# Patient Record
Sex: FEMALE | Race: WHITE | NOT HISPANIC OR LATINO | Employment: STUDENT | URBAN - METROPOLITAN AREA
[De-identification: names, ages, dates, MRNs, and addresses within clinical notes are randomized per-mention and may not be internally consistent; named-entity substitution may affect disease eponyms.]

---
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
## 2018-01-15 ENCOUNTER — OFFICE VISIT (OUTPATIENT)
Dept: FAMILY MEDICINE | Facility: CLINIC | Age: 18
End: 2018-01-15
Payer: COMMERCIAL

## 2018-01-15 VITALS
WEIGHT: 126.6 LBS | OXYGEN SATURATION: 98 % | BODY MASS INDEX: 21.61 KG/M2 | HEIGHT: 64 IN | SYSTOLIC BLOOD PRESSURE: 106 MMHG | TEMPERATURE: 96.2 F | DIASTOLIC BLOOD PRESSURE: 64 MMHG | RESPIRATION RATE: 16 BRPM | HEART RATE: 83 BPM

## 2018-01-15 DIAGNOSIS — G43.801 OTHER MIGRAINE WITH STATUS MIGRAINOSUS, NOT INTRACTABLE: Primary | ICD-10-CM

## 2018-01-15 LAB
BASOPHILS # BLD AUTO: 0 10E9/L (ref 0–0.2)
BASOPHILS NFR BLD AUTO: 0.3 %
DIFFERENTIAL METHOD BLD: NORMAL
EOSINOPHIL # BLD AUTO: 0.1 10E9/L (ref 0–0.7)
EOSINOPHIL NFR BLD AUTO: 1.5 %
ERYTHROCYTE [DISTWIDTH] IN BLOOD BY AUTOMATED COUNT: 12.5 % (ref 10–15)
HCT VFR BLD AUTO: 43.6 % (ref 35–47)
HGB BLD-MCNC: 14.6 G/DL (ref 11.7–15.7)
LYMPHOCYTES # BLD AUTO: 2.5 10E9/L (ref 0.8–5.3)
LYMPHOCYTES NFR BLD AUTO: 42.3 %
MCH RBC QN AUTO: 29.4 PG (ref 26.5–33)
MCHC RBC AUTO-ENTMCNC: 33.5 G/DL (ref 31.5–36.5)
MCV RBC AUTO: 88 FL (ref 78–100)
MONOCYTES # BLD AUTO: 0.5 10E9/L (ref 0–1.3)
MONOCYTES NFR BLD AUTO: 8.9 %
NEUTROPHILS # BLD AUTO: 2.7 10E9/L (ref 1.6–8.3)
NEUTROPHILS NFR BLD AUTO: 47 %
PLATELET # BLD AUTO: 268 10E9/L (ref 150–450)
RBC # BLD AUTO: 4.96 10E12/L (ref 3.8–5.2)
WBC # BLD AUTO: 5.8 10E9/L (ref 4–11)

## 2018-01-15 PROCEDURE — 36415 COLL VENOUS BLD VENIPUNCTURE: CPT | Performed by: PHYSICIAN ASSISTANT

## 2018-01-15 PROCEDURE — 99214 OFFICE O/P EST MOD 30 MIN: CPT | Performed by: PHYSICIAN ASSISTANT

## 2018-01-15 PROCEDURE — 85025 COMPLETE CBC W/AUTO DIFF WBC: CPT | Performed by: PHYSICIAN ASSISTANT

## 2018-01-15 PROCEDURE — 80053 COMPREHEN METABOLIC PANEL: CPT | Performed by: PHYSICIAN ASSISTANT

## 2018-01-15 RX ORDER — SUMATRIPTAN 25 MG/1
25 TABLET, FILM COATED ORAL
Qty: 9 TABLET | Refills: 0 | Status: SHIPPED | OUTPATIENT
Start: 2018-01-15 | End: 2021-08-25

## 2018-01-15 NOTE — LETTER
January 17, 2018      Destini Higgins  [address] [address]        Dear ,    We are writing to inform you of your test results.      -Liver and gallbladder tests are normal. (ALT,AST, Alk phos, bilirubin), kidney function is normal (Cr, GFR), Sodium is normal, Potassium is normal, Calcium is normal, Glucose is normal   -Normal red blood cell (hgb) levels, normal white blood cell count and normal platelet levels.    Resulted Orders   Comprehensive metabolic panel (BMP + Alb, Alk Phos, ALT, AST, Total. Bili, TP)   Result Value Ref Range    Sodium 143 133 - 144 mmol/L    Potassium 4.3 3.4 - 5.3 mmol/L    Chloride 108 96 - 110 mmol/L    Carbon Dioxide 26 20 - 32 mmol/L    Anion Gap 9 3 - 14 mmol/L    Glucose 64 (L) 70 - 99 mg/dL      Comment:      Non Fasting    Urea Nitrogen 12 7 - 30 mg/dL    Creatinine 0.82 0.50 - 1.00 mg/dL    GFR Estimate 89 >60 mL/min/1.7m2      Comment:      Non  GFR Calc    GFR Estimate If Black >90 >60 mL/min/1.7m2      Comment:       GFR Calc    Calcium 9.3 8.5 - 10.1 mg/dL    Bilirubin Total 1.3 0.2 - 1.3 mg/dL    Albumin 4.4 3.4 - 5.0 g/dL    Protein Total 7.8 6.8 - 8.8 g/dL    Alkaline Phosphatase 66 40 - 150 U/L    ALT 29 0 - 50 U/L    AST 18 0 - 35 U/L   CBC with platelets differential   Result Value Ref Range    WBC 5.8 4.0 - 11.0 10e9/L    RBC Count 4.96 3.8 - 5.2 10e12/L    Hemoglobin 14.6 11.7 - 15.7 g/dL    Hematocrit 43.6 35.0 - 47.0 %    MCV 88 78 - 100 fl    MCH 29.4 26.5 - 33.0 pg    MCHC 33.5 31.5 - 36.5 g/dL    RDW 12.5 10.0 - 15.0 %    Platelet Count 268 150 - 450 10e9/L    Diff Method Automated Method     % Neutrophils 47.0 %    % Lymphocytes 42.3 %    % Monocytes 8.9 %    % Eosinophils 1.5 %    % Basophils 0.3 %    Absolute Neutrophil 2.7 1.6 - 8.3 10e9/L    Absolute Lymphocytes 2.5 0.8 - 5.3 10e9/L    Absolute Monocytes 0.5 0.0 - 1.3 10e9/L    Absolute Eosinophils 0.1 0.0 - 0.7 10e9/L    Absolute  Basophils 0.0 0.0 - 0.2 10e9/L       If you have any questions or concerns, please call the clinic at the number listed above.       Sincerely,          Aron Vaca PA-C

## 2018-01-15 NOTE — NURSING NOTE
"Chief Complaint   Patient presents with     Headache       Initial /64 (BP Location: Right arm, Patient Position: Chair, Cuff Size: Adult Regular)  Pulse 83  Temp 96.2  F (35.7  C) (Tympanic)  Resp 16  Ht 5' 3.75\" (1.619 m)  Wt 126 lb 9.6 oz (57.4 kg)  SpO2 98%  BMI 21.9 kg/m2 Estimated body mass index is 21.9 kg/(m^2) as calculated from the following:    Height as of this encounter: 5' 3.75\" (1.619 m).    Weight as of this encounter: 126 lb 9.6 oz (57.4 kg).  Medication Reconciliation: complete    Adela Sloan MA  "

## 2018-01-15 NOTE — PROGRESS NOTES
"  SUBJECTIVE:   Destini Higgins is a 17 year old female who presents to clinic today for the following health issues:    Headache  Onset: x 2 weeks    Description:   Location: right side  Character: throbbing pain  Frequency:  2-3 times over the past 2 weeks, infrequent prior to this  Duration:  3-4 hours     Intensity: moderate    Progression of Symptoms:  same    Accompanying Signs & Symptoms:  Stiff neck: no   Neck or upper back pain: no  Fever: a week ago  Sinus pressure: no   Nausea or vomiting: no   Dizziness: YES  Numbness: no   Weakness: no   Visual changes: YES- with migranes    History:   Head trauma: no   Family history of migraines: no   Previous tests for headaches: no   Neurologist evaluations: no   Able to do daily activities: YES  Wake with a headaches: no   Do headaches wake you up: no   Daily pain medication use: no   Work/school stressors/changes: no     Precipitating factors:   Does light make it worse: YES- migraine onset and screen use will hurt eyes  Does sound make it worse: no     Alleviating factors:  Does sleep help: YES    Therapies Tried and outcome: Ibuprofen (Advil, Motrin)      Over the past 6 months, Destini has been experiencing trouble with word finding as well as intermittent dizziness that she describes as a \" falling\" sensation.  These symptoms have been ongoing but not debilitating.  In the past 3-4 weeks, she has also had 2 headaches that were right sided and began with some blurring of vision and \" squiggles\" in the right eye. She had no nausea associated with the headache.  Headaches resolved with ibuprofen. She has hx of migraines when she was younger, but these symptoms have now been more frequent over the past 2 weeks, no known trigger        Problem list and histories reviewed & adjusted, as indicated.  Additional history: as documented    Patient Active Problem List   Diagnosis     Leg pain     Hamstring muscle strain     Acne vulgaris     Past Surgical History: " "  Procedure Laterality Date     REPAIR LIGAMENT HAND  2003       Social History   Substance Use Topics     Smoking status: Never Smoker     Smokeless tobacco: Never Used     Alcohol use No     Family History   Problem Relation Age of Onset     Family History Negative Mother      Family History Negative Father      Breast Cancer Maternal Grandmother 64     passed from liver failure     Liver Disease Maternal Grandmother      passed from     Leukemia Paternal Grandfather      Schizophrenia Paternal Uncle      DIABETES No family hx of      Coronary Artery Disease No family hx of          Current Outpatient Prescriptions   Medication Sig Dispense Refill     SUMAtriptan (IMITREX) 25 MG tablet Take 1 tablet (25 mg) by mouth at onset of headache for migraine May repeat in 2 hours. Max 8 tablets/24 hours. 9 tablet 0     SPIRONOLACTONE PO Take 12.5 mg by mouth daily       norgestimate-ethinyl estradiol (ORTHO-CYCLEN, SPRINTEC) 0.25-35 MG-MCG per tablet Take 1 tablet by mouth daily (Patient not taking: Reported on 1/15/2018) 84 tablet 3     No Known Allergies      Reviewed and updated as needed this visit by clinical staff     Reviewed and updated as needed this visit by Provider         ROS:  Constitutional, HEENT, cardiovascular, pulmonary, GI, , musculoskeletal, neuro, skin, endocrine and psych systems are negative, except as otherwise noted.      OBJECTIVE:   /64 (BP Location: Right arm, Patient Position: Chair, Cuff Size: Adult Regular)  Pulse 83  Temp 96.2  F (35.7  C) (Tympanic)  Resp 16  Ht 5' 3.75\" (1.619 m)  Wt 126 lb 9.6 oz (57.4 kg)  SpO2 98%  BMI 21.9 kg/m2  Body mass index is 21.9 kg/(m^2).  GENERAL: healthy, alert and no distress  RESP: lungs clear to auscultation - no rales, rhonchi or wheezes  CV: regular rate and rhythm, normal S1 S2, no S3 or S4, no murmur, click or rub  MS: no gross musculoskeletal defects noted, no edema  NEURO: Normal strength and tone, mentation intact and speech normal, " FROM of UE and LE, 5/5 strength noted, UE and LE DTRs intact, finger to nose, heel to shin and Stephen intact  PSYCH: mentation appears normal, affect normal/bright    Diagnostic Test Results:  Results for orders placed or performed in visit on 01/15/18 (from the past 24 hour(s))   CBC with platelets differential   Result Value Ref Range    WBC 5.8 4.0 - 11.0 10e9/L    RBC Count 4.96 3.8 - 5.2 10e12/L    Hemoglobin 14.6 11.7 - 15.7 g/dL    Hematocrit 43.6 35.0 - 47.0 %    MCV 88 78 - 100 fl    MCH 29.4 26.5 - 33.0 pg    MCHC 33.5 31.5 - 36.5 g/dL    RDW 12.5 10.0 - 15.0 %    Platelet Count 268 150 - 450 10e9/L    Diff Method Automated Method     % Neutrophils 47.0 %    % Lymphocytes 42.3 %    % Monocytes 8.9 %    % Eosinophils 1.5 %    % Basophils 0.3 %    Absolute Neutrophil 2.7 1.6 - 8.3 10e9/L    Absolute Lymphocytes 2.5 0.8 - 5.3 10e9/L    Absolute Monocytes 0.5 0.0 - 1.3 10e9/L    Absolute Eosinophils 0.1 0.0 - 0.7 10e9/L    Absolute Basophils 0.0 0.0 - 0.2 10e9/L       ASSESSMENT/PLAN:     1. Other migraine with status migrainosus, not intractable  Headache symptoms are consistent with migraine with visual aura. Unclear etiology of her dizziness and word finding difficulty.  Today we will get basic labs.  We discussed getting an MRI for further evaluation vs. Waiting, patient would like to move forward with MRI  - Comprehensive metabolic panel (BMP + Alb, Alk Phos, ALT, AST, Total. Bili, TP)  - CBC with platelets differential  - MR Brain w/o Contrast; Future  - SUMAtriptan (IMITREX) 25 MG tablet; Take 1 tablet (25 mg) by mouth at onset of headache for migraine May repeat in 2 hours. Max 8 tablets/24 hours.  Dispense: 9 tablet; Refill: 0    See Patient Instructions    Aron Vaca PA-C  [facility]

## 2018-01-15 NOTE — MR AVS SNAPSHOT
"              After Visit Summary   1/15/2018    Destini Higgins    MRN: [id number]           Patient Information     Date Of Birth          2000        Visit Information        Provider Department      1/15/2018 11:40 AM Aron Vaca PA-C [facility] [place]        Today's Diagnoses     Other migraine with status migrainosus, not intractable    -  1       Follow-ups after your visit        Follow-up notes from your care team     Return if symptoms worsen or fail to improve.      Future tests that were ordered for you today     Open Future Orders        Priority Expected Expires Ordered    MR Brain w/o Contrast Routine  1/15/2019 1/15/2018            Who to contact     If you have questions or need follow up information about today's clinic visit or your schedule please contact [facility] [place] directly at [phone].  Normal or non-critical lab and imaging results will be communicated to you by Qraniohart, letter or phone within 4 business days after the clinic has received the results. If you do not hear from us within 7 days, please contact the clinic through Qraniohart or phone. If you have a critical or abnormal lab result, we will notify you by phone as soon as possible.  Submit refill requests through crowdSPRING or call your pharmacy and they will forward the refill request to us. Please allow 3 business days for your refill to be completed.          Additional Information About Your Visit        MyChart Information     crowdSPRING lets you send messages to your doctor, view your test results, renew your prescriptions, schedule appointments and more. To sign up, go to [email or web address] . Click on \"Log in\" on the left side of the screen, which will take you to the Welcome page. Then click on \"Sign up Now\" on the right side of the page.     You will be asked to enter the access code listed below, as well as some personal information. Please follow the directions to create " "your username and password.     Your access code is: [email or web address]  Expires: 4/15/2018 12:20 PM     Your access code will  in 90 days. If you need help or a new code, please call your [place] clinic or [phone].        Care EveryWhere ID     This is your Care EveryWhere ID. This could be used by other organizations to access your [place] medical records  [id number]        Your Vitals Were     Pulse Temperature Respirations Height Pulse Oximetry BMI (Body Mass Index)    83 96.2  F (35.7  C) (Tympanic) 16 5' 3.75\" (1.619 m) 98% 21.9 kg/m2       Blood Pressure from Last 3 Encounters:   01/15/18 106/64   17 110/60   16 108/64    Weight from Last 3 Encounters:   01/15/18 126 lb 9.6 oz (57.4 kg) (47 %)*   17 130 lb (59 kg) (55 %)*   16 123 lb (55.8 kg) (47 %)*     * Growth percentiles are based on [facility] 2-20 Years data.              We Performed the Following     CBC with platelets differential     Comprehensive metabolic panel (BMP + Alb, Alk Phos, ALT, AST, Total. Bili, TP)          Today's Medication Changes          These changes are accurate as of: 1/15/18 12:20 PM.  If you have any questions, ask your nurse or doctor.               Start taking these medicines.        Dose/Directions    SUMAtriptan 25 MG tablet   Commonly known as:  IMITREX   Used for:  Other migraine with status migrainosus, not intractable   Started by:  Aron Vaca PA-C        Dose:  25 mg   Take 1 tablet (25 mg) by mouth at onset of headache for migraine May repeat in 2 hours. Max 8 tablets/24 hours.   Quantity:  9 tablet   Refills:  0            Where to get your medicines      These medications were sent to [facility] [address] [facility] - [place] - [address]  [address] [facility] [address]     Phone:  [phone]     SUMAtriptan 25 MG tablet                Primary Care Provider Office Phone # Fax #    Aron Vaca PA-C [phone]       [address] [place] [address]        Equal Access to Services     ERNIE AUGUSTIN : Hadii dina ku hadxino Sozohaibali, waaxda luqadaha, qaybta kaalmada ashanti, sha fleming. So [facility] [phone].    ATENCIÓN: Si habla español, tiene a west disposición servicios gratuitos de asistencia lingüística. [facility] [phone].    We comply with applicable federal civil rights laws and [place] laws. We do not discriminate on the basis of race, color, national origin, age, disability, sex, sexual orientation, or gender identity.            Thank you!     Thank you for choosing [facility] [place]  for your care. Our goal is always to provide you with excellent care. Hearing back from our patients is one way we can continue to improve our services. Please take a few minutes to complete the written survey that you may receive in the mail after your visit with us. Thank you!             Your Updated Medication List - Protect others around you: Learn how to safely use, store and throw away your medicines at www.disposemymeds.org.          This list is accurate as of: 1/15/18 12:20 PM.  Always use your most recent med list.                   Brand Name Dispense Instructions for use Diagnosis    norgestimate-ethinyl estradiol 0.25-35 MG-MCG per tablet    ORTHO-CYCLEN, SPRINTEC    84 tablet    Take 1 tablet by mouth daily    Birth control counseling       SPIRONOLACTONE PO      Take 12.5 mg by mouth daily        SUMAtriptan 25 MG tablet    IMITREX    9 tablet    Take 1 tablet (25 mg) by mouth at onset of headache for migraine May repeat in 2 hours. Max 8 tablets/24 hours.    Other migraine with status migrainosus, not intractable

## 2018-01-16 LAB
ALBUMIN SERPL-MCNC: 4.4 G/DL (ref 3.4–5)
ALP SERPL-CCNC: 66 U/L (ref 40–150)
ALT SERPL W P-5'-P-CCNC: 29 U/L (ref 0–50)
ANION GAP SERPL CALCULATED.3IONS-SCNC: 9 MMOL/L (ref 3–14)
AST SERPL W P-5'-P-CCNC: 18 U/L (ref 0–35)
BILIRUB SERPL-MCNC: 1.3 MG/DL (ref 0.2–1.3)
BUN SERPL-MCNC: 12 MG/DL (ref 7–30)
CALCIUM SERPL-MCNC: 9.3 MG/DL (ref 8.5–10.1)
CHLORIDE SERPL-SCNC: 108 MMOL/L (ref 96–110)
CO2 SERPL-SCNC: 26 MMOL/L (ref 20–32)
CREAT SERPL-MCNC: 0.82 MG/DL (ref 0.5–1)
GFR SERPL CREATININE-BSD FRML MDRD: 89 ML/MIN/1.7M2
GLUCOSE SERPL-MCNC: 64 MG/DL (ref 70–99)
POTASSIUM SERPL-SCNC: 4.3 MMOL/L (ref 3.4–5.3)
PROT SERPL-MCNC: 7.8 G/DL (ref 6.8–8.8)
SODIUM SERPL-SCNC: 143 MMOL/L (ref 133–144)

## 2018-01-20 ENCOUNTER — TRANSFERRED RECORDS (OUTPATIENT)
Dept: HEALTH INFORMATION MANAGEMENT | Facility: CLINIC | Age: 18
End: 2018-01-20

## 2018-01-22 ENCOUNTER — TELEPHONE (OUTPATIENT)
Dept: FAMILY MEDICINE | Facility: CLINIC | Age: 18
End: 2018-01-22

## 2018-01-22 NOTE — TELEPHONE ENCOUNTER
Results received from [facility] and given to provider to review.  MRI Brain w/out contrast  Kirstie CONTRERAS

## 2018-01-22 NOTE — TELEPHONE ENCOUNTER
Please call patient and inform her that her MRI is normal ( negative) etiology of her symptoms is likely migraines.  She may continue treatment as discussed in the office

## 2018-03-29 ENCOUNTER — MYC MEDICAL ADVICE (OUTPATIENT)
Dept: FAMILY MEDICINE | Facility: CLINIC | Age: 18
End: 2018-03-29

## 2018-03-30 NOTE — TELEPHONE ENCOUNTER
Please see Zilift message and advise.      Thank you,  Giovanna SUTHERLANDRN BSN  [facility]  [phone]

## 2018-03-30 NOTE — TELEPHONE ENCOUNTER
Patient informed via mychart reply with E-visit information and instruction    Jessica Cantrell RN

## 2018-04-01 ENCOUNTER — TRANSFERRED RECORDS (OUTPATIENT)
Dept: HEALTH INFORMATION MANAGEMENT | Facility: CLINIC | Age: 18
End: 2018-04-01

## 2018-04-01 LAB — CHLAMYDIA - HIM PATIENT REPORTED: NEGATIVE

## 2018-06-07 DIAGNOSIS — Z30.09 BIRTH CONTROL COUNSELING: ICD-10-CM

## 2018-06-07 RX ORDER — NORGESTIMATE AND ETHINYL ESTRADIOL 0.25-0.035
1 KIT ORAL DAILY
Qty: 84 TABLET | Refills: 1 | Status: SHIPPED | OUTPATIENT
Start: 2018-06-07 | End: 2018-11-27

## 2018-06-07 NOTE — TELEPHONE ENCOUNTER
"Requested Prescriptions   Pending Prescriptions Disp Refills     norgestimate-ethinyl estradiol (ORTHO-CYCLEN, SPRINTEC) 0.25-35 MG-MCG per tablet  Last Written Prescription Date:  8/17/17  Last Fill Quantity: 84,  # refills: 3   Last office visit: 1/15/2018 with prescribing provider:  Lio   Future Office Visit:     84 tablet 3     Sig: Take 1 tablet by mouth daily    Contraceptives Protocol Failed    6/7/2018 11:13 AM       Failed - Recent (12 mo) or future (30 days) visit within the authorizing provider's specialty    Patient had office visit in the last 12 months or has a visit in the next 30 days with authorizing provider or within the authorizing provider's specialty.  See \"Patient Info\" tab in inbasket, or \"Choose Columns\" in Meds & Orders section of the refill encounter.           Passed - Patient is not a current smoker if age is 35 or older       Passed - No active pregnancy on record       Passed - No positive pregnancy test in past 12 months          "

## 2018-06-07 NOTE — TELEPHONE ENCOUNTER
Pres[place] per [facility] [place].  Mitzy Huerta RN   [facility] - Triage

## 2018-10-19 DIAGNOSIS — Z30.09 BIRTH CONTROL COUNSELING: ICD-10-CM

## 2018-10-19 RX ORDER — NORGESTIMATE AND ETHINYL ESTRADIOL 0.25-0.035
KIT ORAL
Qty: 84 TABLET | Refills: 0 | OUTPATIENT
Start: 2018-10-19

## 2018-10-19 NOTE — TELEPHONE ENCOUNTER
Rx denied.  New rx was sent on 6/7/18 for 6 months and should have enough until 12/7.  Pt is also overdue for physical and med check.    Julianna SHETH RN  EP Triage

## 2018-10-19 NOTE — TELEPHONE ENCOUNTER
"Requested Prescriptions   Pending Prescriptions Disp Refills     FEMYNOR 0.25-35 MG-MCG per tablet [Pharmacy Med Name: FEMYNOR 28 TABLET]  Last Written Prescription Date:  6/7/18  Last Fill Quantity: 84,  # refills: 1   Last office visit: 1/15/2018 with prescribing provider:  Lio   Future Office Visit:     84 tablet 0     Sig: TAKE 1 TABLET BY MOUTH EVERY DAY    Contraceptives Protocol Passed    10/19/2018  2:04 AM       Passed - Patient is not a current smoker if age is 35 or older       Passed - Recent (12 mo) or future (30 days) visit within the authorizing provider's specialty    Patient had office visit in the last 12 months or has a visit in the next 30 days with authorizing provider or within the authorizing provider's specialty.  See \"Patient Info\" tab in inbasket, or \"Choose Columns\" in Meds & Orders section of the refill encounter.             Passed - No active pregnancy on record       Passed - No positive pregnancy test in past 12 months          "

## 2018-10-26 DIAGNOSIS — Z30.09 BIRTH CONTROL COUNSELING: ICD-10-CM

## 2018-10-26 RX ORDER — NORGESTIMATE AND ETHINYL ESTRADIOL 0.25-0.035
1 KIT ORAL DAILY
Qty: 84 TABLET | Refills: 1 | OUTPATIENT
Start: 2018-10-26

## 2018-10-26 NOTE — TELEPHONE ENCOUNTER
Patient has refills remaining with pharmacy.  Ashley Mckenna RN - Triage  [facility]

## 2018-10-26 NOTE — TELEPHONE ENCOUNTER
"Requested Prescriptions   Pending Prescriptions Disp Refills     norgestimate-ethinyl estradiol (ORTHO-CYCLEN, SPRINTEC) 0.25-35 MG-MCG per tablet  Last Written Prescription Date:  6-7-2018  Last Fill Quantity: 84 tablet,  # refills: 1   Last office visit: 1/15/2018 with prescribing provider:     Future Office Visit:     84 tablet 1     Sig: Take 1 tablet by mouth daily    Contraceptives Protocol Passed    10/26/2018 10:25 AM       Passed - Patient is not a current smoker if age is 35 or older       Passed - Recent (12 mo) or future (30 days) visit within the authorizing provider's specialty    Patient had office visit in the last 12 months or has a visit in the next 30 days with authorizing provider or within the authorizing provider's specialty.  See \"Patient Info\" tab in inbasket, or \"Choose Columns\" in Meds & Orders section of the refill encounter.             Passed - No active pregnancy on record       Passed - No positive pregnancy test in past 12 months          "

## 2018-11-16 ENCOUNTER — OFFICE VISIT (OUTPATIENT)
Dept: FAMILY MEDICINE | Facility: CLINIC | Age: 18
End: 2018-11-16
Payer: COMMERCIAL

## 2018-11-16 VITALS
BODY MASS INDEX: 22.53 KG/M2 | HEART RATE: 72 BPM | OXYGEN SATURATION: 100 % | HEIGHT: 64 IN | TEMPERATURE: 97.5 F | WEIGHT: 132 LBS | DIASTOLIC BLOOD PRESSURE: 72 MMHG | SYSTOLIC BLOOD PRESSURE: 101 MMHG

## 2018-11-16 DIAGNOSIS — Z00.00 ENCOUNTER FOR ROUTINE ADULT HEALTH EXAMINATION WITHOUT ABNORMAL FINDINGS: Primary | ICD-10-CM

## 2018-11-16 DIAGNOSIS — G43.809 OTHER MIGRAINE WITHOUT STATUS MIGRAINOSUS, NOT INTRACTABLE: ICD-10-CM

## 2018-11-16 DIAGNOSIS — Z23 NEED FOR PROPHYLACTIC VACCINATION AND INOCULATION AGAINST INFLUENZA: ICD-10-CM

## 2018-11-16 PROCEDURE — 90471 IMMUNIZATION ADMIN: CPT | Performed by: PHYSICIAN ASSISTANT

## 2018-11-16 PROCEDURE — 99213 OFFICE O/P EST LOW 20 MIN: CPT | Mod: 25 | Performed by: PHYSICIAN ASSISTANT

## 2018-11-16 PROCEDURE — 90686 IIV4 VACC NO PRSV 0.5 ML IM: CPT | Performed by: PHYSICIAN ASSISTANT

## 2018-11-16 PROCEDURE — 99395 PREV VISIT EST AGE 18-39: CPT | Mod: 25 | Performed by: PHYSICIAN ASSISTANT

## 2018-11-16 RX ORDER — PROPRANOLOL HYDROCHLORIDE 40 MG/1
40 TABLET ORAL 3 TIMES DAILY
Qty: 90 TABLET | Refills: 0 | Status: SHIPPED | OUTPATIENT
Start: 2018-11-16 | End: 2018-12-24

## 2018-11-16 NOTE — MR AVS SNAPSHOT
After Visit Summary   11/16/2018    Destini Higgins    MRN: [id number]           Patient Information     Date Of Birth          2000        Visit Information        Provider Department      11/16/2018 11:00 AM Aron Vaca PA-C [facility]        Today's Diagnoses     Encounter for routine adult health examination without abnormal findings    -  1    Other migraine without status migrainosus, not intractable          Care Instructions      Preventive Health Recommendations  Female Ages 18 to 20     Yearly exam:     See your health care provider every year in order to  o Review health changes.   o Discuss preventive care.    o Review your medicines if your doctor has prescribed any.      You should be tested each year for STDs (sexually transmitted diseases).       After age 20, talk to your provider about how often you should have cholesterol testing.      If you are at risk for diabetes, you should have a diabetes test (fasting glucose).     Shots:     Get a flu shot each year.     Get a tetanus shot every 10 years.     Consider getting the shot (vaccine) that prevents cervical cancer (Gardasil).    Nutrition:     Eat at least 5 servings of fruits and vegetables each day.    Eat whole-grain bread, whole-wheat pasta and brown rice instead of white grains and rice.    Get adequate Calcium and Vitamin D.     Lifestyle    Exercise at least 150 minutes a week each week (30 minutes a day, 5 days a week). This will help you control your weight and prevent disease.    No smoking.     Wear sunscreen to prevent skin cancer.    See your dentist every six months for an exam and cleaning.          Follow-ups after your visit        Follow-up notes from your care team     Return in about 1 month (around 12/16/2018) for Physical Exam,migraine follow up 1 month.      Who to contact     If you have questions or need follow up information about today's clinic visit or your schedule  "please contact [facility] [place] directly at [phone].  Normal or non-critical lab and imaging results will be communicated to you by MyChart, letter or phone within 4 business days after the clinic has received the results. If you do not hear from us within 7 days, please contact the clinic through Anhelohart or phone. If you have a critical or abnormal lab result, we will notify you by phone as soon as possible.  Submit refill requests through [employer] or call your pharmacy and they will forward the refill request to us. Please allow 3 business days for your refill to be completed.          Additional Information About Your Visit        AnheloharTurpitude Information     [employer] gives you secure access to your electronic health record. If you see a primary care provider, you can also send messages to your care team and make appointments. If you have questions, please call your primary care clinic.  If you do not have a primary care provider, please call [phone] and they will assist you.        Care EveryWhere ID     This is your Care EveryWhere ID. This could be used by other organizations to access your [place] medical records  [id number]        Your Vitals Were     Pulse Temperature Height Last Period Pulse Oximetry BMI (Body Mass Index)    72 97.5  F (36.4  C) (Tympanic) 5' 3.75\" (1.619 m) 11/13/2018 100% 22.84 kg/m2       Blood Pressure from Last 3 Encounters:   11/16/18 101/72   01/15/18 106/64   08/17/17 110/60    Weight from Last 3 Encounters:   11/16/18 132 lb (59.9 kg)   01/15/18 126 lb 9.6 oz (57.4 kg) (47 %)*   08/17/17 130 lb (59 kg) (55 %)*     * Growth percentiles are based on CDC 2-20 Years data.              Today, you had the following     No orders found for display         Today's Medication Changes          These changes are accurate as of 11/16/18 11:40 AM.  If you have any questions, ask your nurse or doctor.               Start taking these medicines.        Dose/Directions    " propranolol 40 MG tablet   Commonly known as:  INDERAL   Used for:  Other migraine without status migrainosus, not intractable   Started by:  Aron Vaca PA-C        Dose:  40 mg   Take 1 tablet (40 mg) by mouth 3 times daily   Quantity:  90 tablet   Refills:  0            Where to get your medicines      These medications were sent to [place] [address] [facility] - [place], [place] - [address]  [address] [facility], [place] [address]     Phone:  [phone]     propranolol 40 MG tablet                Primary Care Provider Office Phone # Fax #    Aron Vaca PA-C [phone]       [address]  [place] [address]        Equal Access to Services     ROSHAN AUGUSTIN : Hadii dina willson hadasho Soomaali, waaxda luqadaha, qaybta kaalmada adeegyada, waxbony berger . So [facility] [phone].    ATENCIÓN: Si habla español, tiene a west disposición servicios gratuitos de asistencia lingüística. [facility] [phone].    We comply with applicable federal civil rights laws and [place] laws. We do not discriminate on the basis of race, color, national origin, age, disability, sex, sexual orientation, or gender identity.            Thank you!     Thank you for choosing [facility] [place]  for your care. Our goal is always to provide you with excellent care. Hearing back from our patients is one way we can continue to improve our services. Please take a few minutes to complete the written survey that you may receive in the mail after your visit with us. Thank you!             Your Updated Medication List - Protect others around you: Learn how to safely use, store and throw away your medicines at www.disposemymeds.org.          This list is accurate as of 11/16/18 11:40 AM.  Always use your most recent med list.                   Brand Name Dispense Instructions for use Diagnosis    norgestimate-ethinyl estradiol 0.25-35 MG-MCG per tablet     ORTHO-CYCLEN, SPRINTEC    84 tablet    Take 1 tablet by mouth daily    Birth control counseling       propranolol 40 MG tablet    INDERAL    90 tablet    Take 1 tablet (40 mg) by mouth 3 times daily    Other migraine without status migrainosus, not intractable       SPIRONOLACTONE PO      Take 12.5 mg by mouth daily        SUMAtriptan 25 MG tablet    IMITREX    9 tablet    Take 1 tablet (25 mg) by mouth at onset of headache for migraine May repeat in 2 hours. Max 8 tablets/24 hours.    Other migraine with status migrainosus, not intractable

## 2018-11-16 NOTE — PROGRESS NOTES
SUBJECTIVE:   CC: Destini Higgins is an 18 year old woman who presents for preventive health visit.     Healthy Habits:    Do you get at least three servings of calcium containing foods daily (dairy, green leafy vegetables, etc.)? yes    Amount of exercise or daily activities, outside of work: 2-3 day(s) per week    Problems taking medications regularly No    Medication side effects: No    Have you had an eye exam in the past two years? yes    Do you see a dentist twice per year? yes    Do you have sleep apnea, excessive snoring or daytime drowsiness?no      Medication Followup of  Sumatriptan     Taking Medication as prescribed: yes    Side Effects:  None    Medication Helping Symptoms:  NO-discuss other med to manage migraine        Destini is experiencing more frequent migraines than previous.  These are occurring every 2 weeks, stress is a trigger. .  At our last visit she was given Imitrex.  She tells me that this caused her to have a lot of nausea and so she has not been taking it.  She has also stopped taking her OCP x 4 months.  Would like to discuss prophylactic migraine treatment today. See last visit, no new changed to headache severity or frequency    Studying abroad in [place] in January, needs school forms signed today    Today's PHQ-2 Score:   PHQ-2 ( 1999 Pfizer) 11/16/2018 8/17/2017   Q1: Little interest or pleasure in doing things 0 0   Q2: Feeling down, depressed or hopeless 0 0   PHQ-2 Score 0 0       Abuse: Current or Past(Physical, Sexual or Emotional)- No  Do you feel safe in your environment - Yes    Social History   Substance Use Topics     Smoking status: Never Smoker     Smokeless tobacco: Never Used     Alcohol use No     If you drink alcohol do you typically have >3 drinks per day or >7 drinks per week? No                     Reviewed orders with patient.  Reviewed health maintenance and updated orders accordingly - Yes  Patient Active Problem List   Diagnosis     Leg pain      Hamstring muscle strain     Acne vulgaris     Past Surgical History:   Procedure Laterality Date     REPAIR LIGAMENT HAND  2003       Social History   Substance Use Topics     Smoking status: Never Smoker     Smokeless tobacco: Never Used     Alcohol use No     Family History   Problem Relation Age of Onset     Family History Negative Mother      Family History Negative Father      Breast Cancer Maternal Grandmother 64     passed from liver failure     Liver Disease Maternal Grandmother      passed from     Leukemia Paternal Grandfather      Schizophrenia Paternal Uncle      Diabetes No family hx of      Coronary Artery Disease No family hx of          Current Outpatient Prescriptions   Medication Sig Dispense Refill     propranolol (INDERAL) 40 MG tablet Take 1 tablet (40 mg) by mouth 3 times daily 90 tablet 0     SPIRONOLACTONE PO Take 12.5 mg by mouth daily       norgestimate-ethinyl estradiol (ORTHO-CYCLEN, SPRINTEC) 0.25-35 MG-MCG per tablet Take 1 tablet by mouth daily (Patient not taking: Reported on 11/16/2018) 84 tablet 1     SUMAtriptan (IMITREX) 25 MG tablet Take 1 tablet (25 mg) by mouth at onset of headache for migraine May repeat in 2 hours. Max 8 tablets/24 hours. (Patient not taking: Reported on 11/16/2018) 9 tablet 0     No Known Allergies  Recent Labs   Lab Test  01/15/18   1221   ALT  29   CR  0.82   GFRESTIMATED  89   GFRESTBLACK  >90   POTASSIUM  4.3        Mammogram not appropriate for this patient based on age.    Pertinent mammograms are reviewed under the imaging tab.  History of abnormal Pap smear: NO - under age 21, PAP not appropriate for age     Reviewed and updated as needed this visit by clinical staff  Tobacco  Allergies  Meds         Reviewed and updated as needed this visit by Provider        Past Medical History:   Diagnosis Date     Cafe au lait spots     right ankle, left inner thigh     Constipation      Cough     albuterol     NO ACTIVE PROBLEMS      TMJ syndrome       "Torticollis     as infant      Past Surgical History:   Procedure Laterality Date     REPAIR LIGAMENT HAND  2003     Obstetric History     No data available          ROS:  CONSTITUTIONAL: NEGATIVE for fever, chills, change in weight  INTEGUMENTARU/SKIN: NEGATIVE for worrisome rashes, moles or lesions  EYES: NEGATIVE for vision changes or irritation  ENT: NEGATIVE for ear, mouth and throat problems  RESP: NEGATIVE for significant cough or SOB  BREAST: NEGATIVE for masses, tenderness or discharge  CV: NEGATIVE for chest pain, palpitations or peripheral edema  GI: NEGATIVE for nausea, abdominal pain, heartburn, or change in bowel habits  : NEGATIVE for unusual urinary or vaginal symptoms. Periods are regular.  MUSCULOSKELETAL: NEGATIVE for significant arthralgias or myalgia  NEURO: NEGATIVE for weakness, dizziness or paresthesias  PSYCHIATRIC: NEGATIVE for changes in mood or affect    OBJECTIVE:   /72  Pulse 72  Temp 97.5  F (36.4  C) (Tympanic)  Ht 5' 3.75\" (1.619 m)  Wt 132 lb (59.9 kg)  LMP 11/13/2018  SpO2 100%  BMI 22.84 kg/m2  EXAM:  GENERAL: healthy, alert and no distress  EYES: Eyes grossly normal to inspection, PERRL and conjunctivae and sclerae normal  HENT: ear canals and TM's normal, nose and mouth without ulcers or lesions  NECK: no adenopathy, no asymmetry, masses, or scars and thyroid normal to palpation  RESP: lungs clear to auscultation - no rales, rhonchi or wheezes  CV: regular rate and rhythm, normal S1 S2, no S3 or S4, no murmur, click or rub, no peripheral edema   ABDOMEN: soft, nontender, no hepatosplenomegaly, no masses and bowel sounds normal  MS: no gross musculoskeletal defects noted, no edema  SKIN: no suspicious lesions or rashes  NEURO: Normal strength and tone, mentation intact and speech normal  PSYCH: mentation appears normal, affect normal/bright    Diagnostic Test Results:  none     ASSESSMENT/PLAN:   1. Encounter for routine adult health examination without abnormal " "findings    2. Other migraine without status migrainosus, not intractable  Will try propranolol x 1 month trial.  She will let me know how this is working for her prior to leaving the country and will refill as needed  - propranolol (INDERAL) 40 MG tablet; Take 1 tablet (40 mg) by mouth 3 times daily  Dispense: 90 tablet; Refill: 0    3. Need for prophylactic vaccination and inoculation against influenza  - FLU VACCINE, SPLIT VIRUS, IM (QUADRIVALENT) [35700]- >3 YRS  - Vaccine Administration, Initial [24840]      COUNSELING:   Reviewed preventive health counseling, as reflected in patient instructions       Regular exercise       Healthy diet/nutrition    BP Readings from Last 1 Encounters:   11/16/18 101/72     Estimated body mass index is 22.84 kg/(m^2) as calculated from the following:    Height as of this encounter: 5' 3.75\" (1.619 m).    Weight as of this encounter: 132 lb (59.9 kg).           reports that she has never smoked. She has never used smokeless tobacco.      Counseling Resources:  ATP IV Guidelines  Pooled Cohorts Equation Calculator  Breast Cancer Risk Calculator  FRAX Risk Assessment  ICSI Preventive Guidelines  Dietary Guidelines for Americans, 2010  [employer]'s MyPlate  ASA Prophylaxis  Lung CA Screening    Aron Vaca PA-C  [facility] [place]    Injectable Influenza Immunization Documentation    1.  Is the person to be vaccinated sick today?   No    2. Does the person to be vaccinated have an allergy to a component   of the vaccine?   No  Egg Allergy Algorithm Link    3. Has the person to be vaccinated ever had a serious reaction   to influenza vaccine in the past?   No    4. Has the person to be vaccinated ever had Guillain-Barré syndrome?   No    Form completed by          "

## 2018-11-27 DIAGNOSIS — Z30.09 BIRTH CONTROL COUNSELING: ICD-10-CM

## 2018-11-27 RX ORDER — NORGESTIMATE AND ETHINYL ESTRADIOL 0.25-0.035
KIT ORAL
Qty: 84 TABLET | Refills: 3 | Status: SHIPPED | OUTPATIENT
Start: 2018-11-27 | End: 2021-08-25

## 2018-11-27 NOTE — TELEPHONE ENCOUNTER
"Requested Prescriptions   Pending Prescriptions Disp Refills     FEMYNOR 0.25-35 MG-MCG per tablet [Pharmacy Med Name: FEMYNOR 28 TABLET]  Last Written Prescription Date:  6/7/18  Last Fill Quantity: 84,  # refills: 1   Last office visit: 11/16/2018 with prescribing provider:  Lio   Future Office Visit:     84 tablet 0     Sig: TAKE 1 TABLET BY MOUTH EVERY DAY    Contraceptives Protocol Passed    11/27/2018  2:49 PM       Passed - Patient is not a current smoker if age is 35 or older       Passed - Recent (12 mo) or future (30 days) visit within the authorizing provider's specialty    Patient had office visit in the last 12 months or has a visit in the next 30 days with authorizing provider or within the authorizing provider's specialty.  See \"Patient Info\" tab in inbasket, or \"Choose Columns\" in Meds & Orders section of the refill encounter.             Passed - No active pregnancy on record       Passed - No positive pregnancy test in past 12 months          "

## 2018-11-28 ENCOUNTER — TELEPHONE (OUTPATIENT)
Dept: FAMILY MEDICINE | Facility: CLINIC | Age: 18
End: 2018-11-28

## 2018-11-28 NOTE — TELEPHONE ENCOUNTER
Patient states that she had stopped Femynor the beginning of October. The last migraine she had was the beginning of November.  Reports that the last 4 weeks she has not had a migraine.  Patient thinks that it is due to the Femynor.  Asking for a prescription for a different oral contraceptive.  LOV 11/16/18 for CPE.  Brittnee Concepcion RN

## 2018-11-28 NOTE — TELEPHONE ENCOUNTER
Prescription approved per [facility] Refill Protocol.    Julianna SHETH RN  EP Triage

## 2018-11-29 NOTE — TELEPHONE ENCOUNTER
Does Destini typically have an aura prior to her migraines?  Would she be interested in a another birth control option such as Nexplanon or IUD?

## 2018-11-29 NOTE — TELEPHONE ENCOUNTER
S/w pt and yes she does get auras before migraines.  Pt states the iud scares her but is interested in the nexplanon but does not know anything about it.  States her mom is not sure of the nexplanon either.  Pt is going abroad starting January.    Scheduled for Monday 12/3 at 3pm for nexplanon and iud discussion.    Routing to Aron SHETH RN  EP Triage

## 2018-12-03 ENCOUNTER — OFFICE VISIT (OUTPATIENT)
Dept: FAMILY MEDICINE | Facility: CLINIC | Age: 18
End: 2018-12-03
Payer: COMMERCIAL

## 2018-12-03 VITALS
HEART RATE: 88 BPM | WEIGHT: 129 LBS | SYSTOLIC BLOOD PRESSURE: 115 MMHG | BODY MASS INDEX: 22.02 KG/M2 | TEMPERATURE: 98.2 F | DIASTOLIC BLOOD PRESSURE: 67 MMHG | HEIGHT: 64 IN | OXYGEN SATURATION: 99 %

## 2018-12-03 DIAGNOSIS — G43.809 OTHER MIGRAINE WITHOUT STATUS MIGRAINOSUS, NOT INTRACTABLE: ICD-10-CM

## 2018-12-03 DIAGNOSIS — Z30.017 ENCOUNTER FOR INITIAL PRESCRIPTION OF IMPLANTABLE SUBDERMAL CONTRACEPTIVE: Primary | ICD-10-CM

## 2018-12-03 LAB — BETA HCG QUAL IFA URINE: NEGATIVE

## 2018-12-03 PROCEDURE — 84703 CHORIONIC GONADOTROPIN ASSAY: CPT | Performed by: PHYSICIAN ASSISTANT

## 2018-12-03 PROCEDURE — 99213 OFFICE O/P EST LOW 20 MIN: CPT | Performed by: PHYSICIAN ASSISTANT

## 2018-12-03 NOTE — MR AVS SNAPSHOT
After Visit Summary   12/3/2018    Destini Higgins    MRN: [id number]           Patient Information     Date Of Birth          2000        Visit Information        Provider Department      12/3/2018 3:00 PM Aron Vaca PA-C [facility]        Today's Diagnoses     Encounter for initial prescription of implantable subdermal contraceptive    -  1    Other migraine without status migrainosus, not intractable           Follow-ups after your visit        Follow-up notes from your care team     Return for if new or persistent symptoms.      Who to contact     If you have questions or need follow up information about today's clinic visit or your schedule please contact [facility] directly at [phone].  Normal or non-critical lab and imaging results will be communicated to you by Newvemhart, letter or phone within 4 business days after the clinic has received the results. If you do not hear from us within 7 days, please contact the clinic through Newvemhart or phone. If you have a critical or abnormal lab result, we will notify you by phone as soon as possible.  Submit refill requests through VisConPro or call your pharmacy and they will forward the refill request to us. Please allow 3 business days for your refill to be completed.          Additional Information About Your Visit        MyChart Information     VisConPro gives you secure access to your electronic health record. If you see a primary care provider, you can also send messages to your care team and make appointments. If you have questions, please call your primary care clinic.  If you do not have a primary care provider, please call [phone] and they will assist you.        Care EveryWhere ID     This is your Care EveryWhere ID. This could be used by other organizations to access your [facility] medical records  [id number]        Your Vitals Were     Pulse Temperature Height Last Period Pulse  "Oximetry BMI (Body Mass Index)    88 98.2  F (36.8  C) (Tympanic) 5' 3.75\" (1.619 m) 11/13/2018 99% 22.32 kg/m2       Blood Pressure from Last 3 Encounters:   12/03/18 115/67   11/16/18 101/72   01/15/18 106/64    Weight from Last 3 Encounters:   12/03/18 129 lb (58.5 kg)   11/16/18 132 lb (59.9 kg)   01/15/18 126 lb 9.6 oz (57.4 kg) (47 %)*     * Growth percentiles are based on [facility] 2-20 Years data.              We Performed the Following     Beta HCG Qual, Urine - [facility] ([id number])        Primary Care Provider Office Phone # Fax #    Aron Vaca PA-C [phone]        [facility] [address]  [place] [address]        Equal Access to Services     [facility]: Hadii aad ku hadasho Soomaali, waaxda luqadaha, qaybta kaalmada adeegyada, waxay miguelin haymigueln alirio berger . So [facility] [phone].    ATENCIÓN: Si habla español, tiene a west disposición servicios gratuitos de asistencia lingüística. Llame al [phone].    We comply with applicable federal civil rights laws and [place] laws. We do not discriminate on the basis of race, color, national origin, age, disability, sex, sexual orientation, or gender identity.            Thank you!     Thank you for choosing [facility] [place]  for your care. Our goal is always to provide you with excellent care. Hearing back from our patients is one way we can continue to improve our services. Please take a few minutes to complete the written survey that you may receive in the mail after your visit with us. Thank you!             Your Updated Medication List - Protect others around you: Learn how to safely use, store and throw away your medicines at www.disposemymeds.org.          This list is accurate as of 12/3/18  4:18 PM.  Always use your most recent med list.                   Brand Name Dispense Instructions for use Diagnosis    FEMYNOR 0.25-35 MG-MCG tablet   Generic drug:  norgestimate-ethinyl estradiol     84 " tablet    TAKE 1 TABLET BY MOUTH EVERY DAY    Birth control counseling       propranolol 40 MG tablet    INDERAL    90 tablet    Take 1 tablet (40 mg) by mouth 3 times daily    Other migraine without status migrainosus, not intractable       SPIRONOLACTONE PO      Take 12.5 mg by mouth daily        SUMAtriptan 25 MG tablet    IMITREX    9 tablet    Take 1 tablet (25 mg) by mouth at onset of headache for migraine May repeat in 2 hours. Max 8 tablets/24 hours.    Other migraine with status migrainosus, not intractable

## 2018-12-03 NOTE — PROGRESS NOTES
SUBJECTIVE:   Destini Higgins is a 18 year old female who presents to clinic today for the following health issues:      Concern - Contraception Consult       Description:   Discuss/consult Nexplanon implant   Previous OCP use   Lmp:  11/24/2018      Griselda has hx of migraine with aura, tells me that she has been off  for 4-5 months and her migraines are much better controlled without OCP.  She would like to discuss different birth control options today.  She will be studying abroad in [place] at the end of the month.  She is not interested in a daily options.          Problem list and histories reviewed & adjusted, as indicated.  Additional history: as documented    Patient Active Problem List   Diagnosis     Leg pain     Hamstring muscle strain     Acne vulgaris     Other migraine without status migrainosus, not intractable     Past Surgical History:   Procedure Laterality Date     REPAIR LIGAMENT HAND  2003       Social History   Substance Use Topics     Smoking status: Never Smoker     Smokeless tobacco: Never Used     Alcohol use No     Family History   Problem Relation Age of Onset     Family History Negative Mother      Family History Negative Father      Breast Cancer Maternal Grandmother 64     passed from liver failure     Liver Disease Maternal Grandmother      passed from     Leukemia Paternal Grandfather      Schizophrenia Paternal Uncle      Diabetes No family hx of      Coronary Artery Disease No family hx of          Current Outpatient Prescriptions   Medication Sig Dispense Refill     FEMYNOR 0.25-35 MG-MCG per tablet TAKE 1 TABLET BY MOUTH EVERY DAY 84 tablet 3     propranolol (INDERAL) 40 MG tablet Take 1 tablet (40 mg) by mouth 3 times daily 90 tablet 0     SPIRONOLACTONE PO Take 12.5 mg by mouth daily       SUMAtriptan (IMITREX) 25 MG tablet Take 1 tablet (25 mg) by mouth at onset of headache for migraine May repeat in 2 hours. Max 8 tablets/24 hours. (Patient not taking: Reported on  "12/3/2018) 9 tablet 0     No Known Allergies    Reviewed and updated as needed this visit by clinical staff       Reviewed and updated as needed this visit by Provider         ROS:  Constitutional, HEENT, cardiovascular, pulmonary, gi and gu systems are negative, except as otherwise noted.    OBJECTIVE:     /67  Pulse 88  Temp 98.2  F (36.8  C) (Tympanic)  Ht 5' 3.75\" (1.619 m)  Wt 129 lb (58.5 kg)  LMP 11/13/2018  SpO2 99%  BMI 22.32 kg/m2  Body mass index is 22.32 kg/(m^2).  GENERAL: healthy, alert and no distress  RESP: lungs clear to auscultation - no rales, rhonchi or wheezes  CV: regular rate and rhythm, normal S1 S2, no S3 or S4, no murmur, click or rub, no peripheral edema and peripheral pulses strong  PSYCH: mentation appears normal, affect normal/bright    Diagnostic Test Results:  Results for orders placed or performed in visit on 12/03/18 (from the past 24 hour(s))   Beta HCG Qual, Urine - [facility] ([id number])   Result Value Ref Range    Beta HCG Qual IFA Urine Negative NEG^Negative        Procedure: Informed consent obtained. left arm was prepped with alcohol. 3CC 1% lido with epi was injected about 8 cm from the elbow.  Area was cleaned with betadine and prepped using steriel procedure.  Nexplanon device was inserted into left arm about 8 cm proximal to elbow. Area was wrapped with  Steri strip and coban.  Home care instructions given. Patient tolerated procedure well.   ASSESSMENT/PLAN:     1. Encounter for initial prescription of implantable subdermal contraceptive    - Beta HCG Qual, Urine - [facility] ([id number])    2. Other migraine without status migrainosus, not intractable  Recommended against MASHA due to migraine with aura.  Patient elected to have Nexplanon placed today.  She will keep monitoring for further headache symptoms, but these are currently well controlled      See Patient Instructions    Aron Vaca PA-C  [facility] [place]    "

## 2018-12-21 DIAGNOSIS — G43.809 OTHER MIGRAINE WITHOUT STATUS MIGRAINOSUS, NOT INTRACTABLE: ICD-10-CM

## 2018-12-21 NOTE — TELEPHONE ENCOUNTER
Routing refill request to provider for review/approval because:  Unclear to why ordered for 1 month at 11/16 appointment.   Please advise if follow up needed. Thanks  Brittnee Concepcion RN

## 2018-12-21 NOTE — TELEPHONE ENCOUNTER
"Requested Prescriptions   Pending Prescriptions Disp Refills     propranolol (INDERAL) 40 MG tablet  Last Written Prescription Date:  11-  Last Fill Quantity: 90 tablet,  # refills: 0   Last office visit: 12/3/2018 with prescribing provider:     Future Office Visit:     90 tablet 0     Sig: Take 1 tablet (40 mg) by mouth 3 times daily    Beta-Blockers Protocol Passed - 12/21/2018 12:58 PM       Passed - Blood pressure under 140/90 in past 12 months    BP Readings from Last 3 Encounters:   12/03/18 115/67   11/16/18 101/72   01/15/18 106/64 (23 %/ 42 %)*     *BP percentiles are based on the August 2017 AAP Clinical Practice Guideline for girls                Passed - Patient is age 6 or older       Passed - Recent (12 mo) or future (30 days) visit within the authorizing provider's specialty    Patient had office visit in the last 12 months or has a visit in the next 30 days with authorizing provider or within the authorizing provider's specialty.  See \"Patient Info\" tab in inbasket, or \"Choose Columns\" in Meds & Orders section of the refill encounter.                  "

## 2018-12-23 DIAGNOSIS — G43.809 OTHER MIGRAINE WITHOUT STATUS MIGRAINOSUS, NOT INTRACTABLE: ICD-10-CM

## 2018-12-24 RX ORDER — PROPRANOLOL HYDROCHLORIDE 40 MG/1
40 TABLET ORAL 3 TIMES DAILY
Qty: 90 TABLET | Refills: 0 | Status: SHIPPED | OUTPATIENT
Start: 2018-12-24 | End: 2021-08-25

## 2018-12-24 NOTE — TELEPHONE ENCOUNTER
"Requested Prescriptions   Pending Prescriptions Disp Refills     propranolol (INDERAL) 40 MG tablet [Pharmacy Med Name: PROPRANOLOL 40 MG TABLET] 90 tablet 0     Sig: TAKE 1 TABLET (40 MG) BY MOUTH 3 TIMES DAILY    Beta-Blockers Protocol Passed - 12/23/2018 11:01 PM       Passed - Blood pressure under 140/90 in past 12 months    BP Readings from Last 3 Encounters:   12/03/18 115/67   11/16/18 101/72   01/15/18 106/64 (23 %/ 42 %)*     *BP percentiles are based on the August 2017 AAP Clinical Practice Guideline for girls                Passed - Patient is age 6 or older       Passed - Recent (12 mo) or future (30 days) visit within the authorizing provider's specialty    Patient had office visit in the last 12 months or has a visit in the next 30 days with authorizing provider or within the authorizing provider's specialty.  See \"Patient Info\" tab in inbasket, or \"Choose Columns\" in Meds & Orders section of the refill encounter.              propranolol (INDERAL) 40 MG tablet 90 tablet 0 12/24/2018       Last Written Prescription Date:  12/24/2018  Last Fill Quantity: 90,  # refills: 0   Last office visit: 12/3/2018 with prescribing provider:  Dr. Vaca   Future Office Visit:  Unknown     "

## 2018-12-26 RX ORDER — PROPRANOLOL HYDROCHLORIDE 40 MG/1
40 TABLET ORAL 3 TIMES DAILY
Qty: 90 TABLET | Refills: 0 | OUTPATIENT
Start: 2018-12-26

## 2019-04-22 ENCOUNTER — NURSE TRIAGE (OUTPATIENT)
Dept: NURSING | Facility: CLINIC | Age: 19
End: 2019-04-22

## 2019-04-22 NOTE — TELEPHONE ENCOUNTER
Destini has had off and on symptoms of upper abdominal pain that goes up into her chest. It's worse if she's really full. Belching will relieve but only for a short period. Alcohol makes it worse. Tums haven't helped.   This has been happening since she went away for college > 2 hrs ago. It doesn't interfere with her daily activities. Per the protocol, I recommended she be seen within the next three days. She stated agreement and understanding. I transferred her to scheduling.    Reason for Disposition    [1] Abdominal pain is intermittent AND [2] shoots into chest, with sour taste in mouth    Additional Information    Negative: Severe difficulty breathing (e.g., struggling for each breath, speaks in single words)    Negative: Shock suspected (e.g., cold/pale/clammy skin, too weak to stand, low BP, rapid pulse)    Negative: Difficult to awaken or acting confused  (e.g., disoriented, slurred speech)    Negative: Passed out (i.e., lost consciousness, collapsed and was not responding)    Negative: Visible sweat on face or sweat dripping down face    Negative: Sounds like a life-threatening emergency to the triager    Negative: [1] SEVERE pain (e.g., excruciating) AND [2] present > 1 hour    Negative: [1] Pain lasts > 10 minutes AND [2] age > 50    Negative: [1] Pain lasts > 10 minutes AND [2] age > 40 AND [3] associated chest, arm, neck, upper back or jaw pain    Negative: [1] Pain lasts > 10 minutes AND [2] age > 35 AND [3] at least one cardiac risk factor (i.e., hypertension, diabetes, obesity, smoker or strong family history of heart disease)    Negative: [1] Pain lasts > 10 minutes AND [2] history of heart disease (i.e., heart attack, bypass surgery, angina, angioplasty, CHF; not just a heart murmur)    Negative: [1] Pain lasts > 10 minutes AND [2] difficulty breathing    Negative: [1] Vomiting AND [2] contains red blood  (Exception: few streaks and only occurred once)    Negative: [1] Vomiting AND [2] contains black  "(\"coffee ground\") material    Negative: Blood in bowel movements  (Exception: Blood on surface of BM with constipation)    Negative: Black or tarry bowel movements  (Exception: chronic-unchanged  black-grey bowel movements AND is taking iron pills or Pepto-bismol)    Negative: [1] Pregnant > 24 weeks AND [2] hand or face swelling    Negative: Patient sounds very sick or weak to the triager    Negative: [1] MILD-MODERATE pain AND [2] constant AND [3] present > 2 hours    Negative: [1] MILD-MODERATE pain AND [2] not relieved by antacids    Negative: [1] Vomiting AND [2] contains bile (green color)    Negative: [1] Vomiting AND [2] abdomen looks much more swollen than usual    Negative: White of the eyes have turned yellow (i.e., jaundice)    Negative: Fever > 103 F (39.4 C)    Negative: [1] Fever > 101 F (38.3 C) AND [2] age > 60    Negative: [1] Fever > 101 F (38.3 C) AND [2] bedridden (e.g., nursing home patient, CVA, chronic illness, recovering from surgery)    Negative: [1] Fever > 100.5 F (38.1 C) AND [2] diabetes mellitus or weak immune system (e.g., HIV positive, cancer chemo, splenectomy, organ transplant, chronic steroids)    Negative: [1] MODERATE pain (e.g., interferes with normal activities) AND [2] comes and goes (cramps) AND [3] present > 24 hours  (Exception: pain with Vomiting or Diarrhea - see that Guideline)    Negative: [1] MILD pain (e.g., does not interfere with normal activities) AND [2] comes and goes (cramps) AND [3] present > 72 hours    Negative: Alcohol abuse known or suspected    Protocols used: ABDOMINAL PAIN - UPPER-ADULT-      "

## 2019-04-24 ENCOUNTER — OFFICE VISIT (OUTPATIENT)
Dept: FAMILY MEDICINE | Facility: CLINIC | Age: 19
End: 2019-04-24
Payer: COMMERCIAL

## 2019-04-24 VITALS
WEIGHT: 126 LBS | HEART RATE: 80 BPM | OXYGEN SATURATION: 97 % | BODY MASS INDEX: 21.8 KG/M2 | DIASTOLIC BLOOD PRESSURE: 60 MMHG | SYSTOLIC BLOOD PRESSURE: 100 MMHG | TEMPERATURE: 96.1 F

## 2019-04-24 DIAGNOSIS — K21.9 GASTROESOPHAGEAL REFLUX DISEASE, ESOPHAGITIS PRESENCE NOT SPECIFIED: Primary | ICD-10-CM

## 2019-04-24 PROCEDURE — 99213 OFFICE O/P EST LOW 20 MIN: CPT | Performed by: FAMILY MEDICINE

## 2019-04-24 NOTE — Clinical Note
Please abstract the following data from this visit with this patient into the appropriate field in Epic:Chlamydia testing done on this date: 4/2018 Normal

## 2019-04-24 NOTE — PROGRESS NOTES
SUBJECTIVE:   Destini Higgins is a 19 year old female who presents to clinic today for the following   health issues:      Gastrointestinal symptoms      Duration: 4 months     REFLUX SYMPTOMS - belching  Sometimes feel like a sick sensation in the mouth.  No radiation of that pain.  No nausea, vomiting.  No abdominal discomfort.      Intensity:  moderate    Accompanying signs and symptoms:  none    History  Previous {similar problem: no   Previous evaluation:  none    Aggravating factors: Coffee, large meals and empty stomach    Alleviating factors: nothing    Other Therapies tried: TUMS no help              Reviewed  and updated as needed this visit by clinical staff  Tobacco  Allergies  Meds         Reviewed and updated as needed this visit by Provider         Patient Active Problem List   Diagnosis     Leg pain     Hamstring muscle strain     Acne vulgaris     Other migraine without status migrainosus, not intractable     Past Surgical History:   Procedure Laterality Date     REPAIR LIGAMENT HAND  2003       Social History     Tobacco Use     Smoking status: Never Smoker     Smokeless tobacco: Never Used   Substance Use Topics     Alcohol use: No     Alcohol/week: 0.0 oz     Family History   Problem Relation Age of Onset     Family History Negative Mother      Family History Negative Father      Breast Cancer Maternal Grandmother 64        passed from liver failure     Liver Disease Maternal Grandmother         passed from     Leukemia Paternal Grandfather      Schizophrenia Paternal Uncle      Diabetes No family hx of      Coronary Artery Disease No family hx of          Current Outpatient Medications   Medication Sig Dispense Refill     etonogestrel (IMPLANON/NEXPLANON) 68 MG IMPL 1 each by Subdermal route once       omeprazole (PRILOSEC) 20 MG DR capsule Take 1 capsule (20 mg) by mouth daily 30 capsule 1     SPIRONOLACTONE PO Take 12.5 mg by mouth daily       FEMYNOR 0.25-35 MG-MCG per tablet TAKE 1  TABLET BY MOUTH EVERY DAY (Patient not taking: Reported on 4/24/2019) 84 tablet 3     propranolol (INDERAL) 40 MG tablet Take 1 tablet (40 mg) by mouth 3 times daily (Patient not taking: Reported on 4/24/2019) 90 tablet 0     SUMAtriptan (IMITREX) 25 MG tablet Take 1 tablet (25 mg) by mouth at onset of headache for migraine May repeat in 2 hours. Max 8 tablets/24 hours. (Patient not taking: Reported on 12/3/2018) 9 tablet 0       ROS:  CONSTITUTIONAL: NEGATIVE for fever, chills, change in weight  ENT/MOUTH: NEGATIVE for ear, mouth and throat problems  RESP: NEGATIVE for significant cough or SOB  CV: NEGATIVE for chest pain, palpitations or peripheral edema    OBJECTIVE:                                                    /60   Pulse 80   Temp 96.1  F (35.6  C) (Tympanic)   Wt 57.2 kg (126 lb)   LMP 04/17/2019 (Approximate)   SpO2 97%   BMI 21.80 kg/m    Body mass index is 21.8 kg/m .   GENERAL: healthy, alert, well nourished, well hydrated, no distress  NECK: no tenderness, no adenopathy, no asymmetry, no masses, no stiffness; thyroid- normal to palpation  RESP: lungs clear to auscultation - no rales, no rhonchi, no wheezes  CV: regular rates and rhythm, normal S1 S2, no S3 or S4 and no murmur, no click or rub -  ABDOMEN: soft, no tenderness, no  hepatosplenomegaly, no masses, normal bowel sounds       ASSESSMENT/PLAN:                                                        ICD-10-CM    1. Gastroesophageal reflux disease, esophagitis presence not specified K21.9 omeprazole (PRILOSEC) 20 MG DR capsule       Patient symptoms are possibly mild indigestion with some acid reflux symptoms.  I suggested she should avoid eating late at night.  Versus eating healthy.  For time being we can try a course of Prilosec to see if that helps.  She is advised to take it off an hour before her biggest meal.  If symptoms persist despite this please follow-up in the clinic.    Nestor Rosenbaum MD  [facility]  [facility]

## 2019-05-17 DIAGNOSIS — K21.9 GASTROESOPHAGEAL REFLUX DISEASE, ESOPHAGITIS PRESENCE NOT SPECIFIED: ICD-10-CM

## 2019-05-17 NOTE — TELEPHONE ENCOUNTER
"Requested Prescriptions   Pending Prescriptions Disp Refills     omeprazole (PRILOSEC) 20 MG DR capsule [Pharmacy Med Name: OMEPRAZOLE DR 20 MG CAPSULE] 30 capsule 0     Sig: TAKE 1 CAPSULE BY MOUTH EVERY DAY       PPI Protocol Passed - 5/17/2019  9:31 AM        Passed - Not on Clopidogrel (unless Pantoprazole ordered)        Passed - No diagnosis of osteoporosis on record        Passed - Recent (12 mo) or future (30 days) visit within the authorizing provider's specialty     Patient had office visit in the last 12 months or has a visit in the next 30 days with authorizing provider or within the authorizing provider's specialty.  See \"Patient Info\" tab in inbasket, or \"Choose Columns\" in Meds & Orders section of the refill encounter.              Passed - Medication is active on med list        Passed - Patient is age 18 or older        Passed - No active pregnacy on record        Passed - No positive pregnancy test in past 12 months        Last Written Prescription Date:  04/24/2019  Last Fill Quantity: 30,  # refills: 1   Last office visit: 4/24/2019 with prescribing provider:  yes   Future Office Visit:      "

## 2019-05-18 NOTE — TELEPHONE ENCOUNTER
Routing refill request to provider for review/approval because:  Please advise if patient is to continue this long term or if it ws for short term use? If short term how long should she be taking it?    JANETH Barahona, RN, N  [facility] [phone]

## 2019-06-21 DIAGNOSIS — K21.9 GASTROESOPHAGEAL REFLUX DISEASE, ESOPHAGITIS PRESENCE NOT SPECIFIED: ICD-10-CM

## 2019-06-21 NOTE — TELEPHONE ENCOUNTER
"Last Written Prescription Date:  5/20/19  Last Fill Quantity: 30,  # refills: 0   Last office visit: 4/24/2019 with prescribing provider:     Future Office Visit:    Requested Prescriptions   Pending Prescriptions Disp Refills     omeprazole (PRILOSEC) 20 MG DR capsule [Pharmacy Med Name: OMEPRAZOLE DR 20 MG CAPSULE] 30 capsule 0     Sig: TAKE 1 CAPSULE BY MOUTH EVERY DAY       PPI Protocol Passed - 6/21/2019 10:35 AM        Passed - Not on Clopidogrel (unless Pantoprazole ordered)        Passed - No diagnosis of osteoporosis on record        Passed - Recent (12 mo) or future (30 days) visit within the authorizing provider's specialty     Patient had office visit in the last 12 months or has a visit in the next 30 days with authorizing provider or within the authorizing provider's specialty.  See \"Patient Info\" tab in inbasket, or \"Choose Columns\" in Meds & Orders section of the refill encounter.              Passed - Medication is active on med list        Passed - Patient is age 18 or older        Passed - No active pregnacy on record        Passed - No positive pregnancy test in past 12 months          "

## 2019-10-04 ENCOUNTER — TRANSFERRED RECORDS (OUTPATIENT)
Dept: HEALTH INFORMATION MANAGEMENT | Facility: CLINIC | Age: 19
End: 2019-10-04

## 2020-09-17 ENCOUNTER — TRANSFERRED RECORDS (OUTPATIENT)
Dept: HEALTH INFORMATION MANAGEMENT | Facility: CLINIC | Age: 20
End: 2020-09-17

## 2020-09-23 ENCOUNTER — HOSPITAL ENCOUNTER (OUTPATIENT)
Dept: SPEECH THERAPY | Facility: CLINIC | Age: 20
Setting detail: THERAPIES SERIES
End: 2020-09-23
Attending: STUDENT IN AN ORGANIZED HEALTH CARE EDUCATION/TRAINING PROGRAM
Payer: COMMERCIAL

## 2020-09-23 PROCEDURE — 92507 TX SP LANG VOICE COMM INDIV: CPT | Mod: GN | Performed by: STUDENT IN AN ORGANIZED HEALTH CARE EDUCATION/TRAINING PROGRAM

## 2020-09-23 PROCEDURE — 92524 BEHAVRAL QUALIT ANALYS VOICE: CPT | Mod: GN | Performed by: STUDENT IN AN ORGANIZED HEALTH CARE EDUCATION/TRAINING PROGRAM

## 2020-10-14 NOTE — PROGRESS NOTES
[facility] OP SLP Voice Evaluation  09/23/20 1000   General Information   Type Of Visit Initial   Start Of Care Date 09/23/20   Referring Physician Paulie Moreno MD  (ENT)   Orders Evaluate And Treat   Medical Diagnosis Dysphonia with vocal nodules   Onset Of Illness/injury Or Date Of Surgery 09/17/20  (order date)   Precautions/Limitations  no known precautions/limitations   Hearing WFL for 1:1 conversation in session   Avocational voice uses Pt sings in a cover band, primarily singing country and pop music.   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  Pt reports that she has been losing her voice off and on for the past 4 years, but her symptoms have worsened over the past year.  She will lose her voice after singing, extensive talking, or talking in background noise.  She is unable to sing an entire gig without losing her voice.  Alcohol consumption makes it more likely for her to lose her voice.  She used to be able to recover her voice after a few days of voice loss, but now her voice is not recovering as well.  She notes difficulty singing higher pitches and switching into her head register.  Her throat will hurt the more she uses her voice, or if she tries to project.  People often have difficulty hearing her because of her difficulty with projection.  Drinking water, using a vocal steamer, and cough drops help her voice a little.  She has acid reflux, and recently started on omeprazole.  Pt denies difficulties with cough/throat clear, swallowing, PND.  Since March 2020, she will often feel like she is unable to take a deep breath.  Please see chart for additional PMH.   Prior Level of Functioning No previous problems.   General Observations Pt reports that today is a typical voice day.   Patient/family Goals To be able to talk and sing without losing her voice   Evaluation  Results   Voice Observations VISIBLE TENSION: neck.  PALPATION OF THYROHYOID REGION: firm musculature with closure of the thyrohyoid space on phonation, but no reported tenderness.   Voice Profile during conversation, 1 min monologue and paragraph reading   Voice Quality Breathy;Raspy;Hoarse;Glottal caballero;Phonation gap   Voice quality comments SPEECH: Consistent mild-moderate strain, consistent mild breathiness, and intermittent moderate roughness vs glottal caballero.  Rare instances of mild pitch/phonation breaks also observed.  SINGING: HBD: mildly strained, but with less breathiness.  REPERTOIRE: consistent mild breathiness and strain with easy singing, increased strain and neck involvement with belt.  THERAPY PROBES: good improvement in voice quality with flow mode, forward resonance, and semi-occluded vocal tract probes.   Voice quality severity rating continuum (1=Severe, 7=WNL) 4  (CAPE-V Overall Severity: 44/100)   Breath control Tight   Breath Control comments Inspirations are inadequate for speech in volume and frequency.  Talks to past end of breath stream with poor respiratory/phonatory coordination.   Breath control severity rating continuum (1=Severe, 7=WNL) 4   Voice Use / Effort Pinched / squeezed larynx;Contraction of neck muscles;Throat push   Voice Use / Effort comments Pt rates her phonatory effort for speech as 0/10 (10 is maximum effort), noting that effort for singing is currently 2/10 but can increase to 8/10 when her voice becomes fatigued.   Voice use / Effort severity rating continuum (1=Severe, 7=WNL) 5   Fundamental frequency (Hz)   (Centered around E3)   Pitch /Frequency Description Pitch breaks   Pitch / Frequency comments Rare instances of mild pitch/phonation breaks observed.  Increased breathiness and roughness at higher pitches.   Pitch / Frequency severity rating continuum (1=Severe, 7=WNL) 5   Volume Too loud   Volume comments Volume for conversational speech is mildly loud, but still  appropriate for the setting (1:1 conversation in quiet room).  A whisper is normal.  Soft phonation is mildly breathy and rough.  Loud phonation is strained, with no breathiness or roughness and good volume increase.   Volume severity rating continuum (1=Severe, 7=WNL) 6   Neuromuscular Control WNL   Neuromuscular Control severity rating continuum (1=Severe, 7=WNL) 7   Resonance WNL   Resonance severity rating continuum (1=Severe, 7=WNL) 7   Comments Moderate dysphonia characterized by roughness, breathiness, strain, glottal caballero, pitch/phonation breaks, increased dysphonia at high pitches, reduced vibratory function of the vocal folds with elevated S/Z ratio, poor respiratory/phonatory coordination, and increased phonatory effort with tight laryngeal musculature and neck involvement during phonation for speech and singing.   Adduction /Abduction Function   Laryngeal diadokinetic speed   (WNL)   Laryngeal diadokinetic strength   (Mildly strained)   Laryngeal diadokinetic consistency Regular   Adduction / Abduction function scale Age 11 - 65, norm per sec:  5+   Vibratory Function of Vocal Folds   Prolonged 'ah' at mid pitch (sec) 8.5 seconds at Ab3 with mild breathiness   Prolonged 'ah' at high pitch (sec) 9.0 seconds at C4 with increased breathiness and strain   Prolonged 'ah' at low pitch (sec) 8.8 seconds at F3 with minimal breathiness and louder volume   Vibratory Function of Vocal Folds Scale Females 20 - 80 yrs: 10 - 22 secs   Vibratory Function of Vocal Folds Comments Reduced in duration and quality   Mucosal Function of the Vocal Folds   S/Z ratio (__ sec/ __sec = __percentage) 2.3  (>1.4 is suggestive of laryngeal pathology)   Vocal fold stiffness / swelling test Positive for stiffness/swelling   Function of Lengtheners / Shorteners (CT and TA Muscles)   Pitch glides Upper pitches more dysphonic  (Lowest: C3; Highest: C#6 with increased dysphonia at C#5)   Respiratory Function Screening   Longest prolonged  "\"S\" from S/Z ratio (#of sec) 29.7 seconds   Videostroboscopy / Endoscopy   Other observations Laryngoscopy completed by Dr. Moreno significant for vocal cord nodules.   General Therapy Interventions   Planned Therapy Interventions Voice   Voice Breath flow to sound flow;Voice quality/pitch or volume tasks;Resonant voice techniques;No larynx effort practice   Impressions and Recommendations   Communication Diagnosis Dysphonia, Vocal cord nodules   Summary Ms. Higgins presents with moderate dysphonia characterized by roughness, breathiness, strain, glottal caballero, pitch/phonation breaks, increased dysphonia at high pitches, reduced vibratory function of the vocal folds with elevated S/Z ratio, poor respiratory/phonatory coordination, and increased phonatory effort with tight laryngeal musculature and neck involvement during phonation for speech and singing.  Based on today's evaluation and previous laryngoscopy with ENT, dysphonia is primarily accounted for by the vocal fold nodules and likely aberrations in vibratory characteristics and mucosal wave.  Dysphonia is likely compounded by hyperfunction of the intrinsic and extrinsic laryngeal musculature.  Pt has heavy vocal demands as a ramirez, and has difficulty meeting these demands because of her voice problems.   Recommendations A course of skilled speech therapy is recommended in order to optimize vocal technique, improve voice quality, promote reduced vocal fold impact to help reduce the nodules, and promote reduced laryngeal effort, fatigue, and irritation, so that patient is able to meet her vocal demands for speech and singing.   Frequency and Duration 1x/week for 6 weeks with 2-3 monthly follow-ups   Prognosis  Good with intervention   Risks and Benefits of Treatment have been explained. Yes   Patient & /or Caregiver  in agreement with plan of care Yes   Patient Education SLP provided education regarding evaluation findings and proposed POC.  Therapy " initiated today.   Educational Assessment   Barriers to Learning No barriers   Preferred Learning Style Listening;Reading;Demonstration;Pictures / Video   Voice Goals   Voice Goals 1;2;3;4   Voice Goal 1   Goal Identifier Generalization   Goal Description Patient will report a week of typical activities in which dysphonia, vocal fatigue, and vocal effort do not exceed a level of 2 out of 10, 90% of the time, so that patient is able to meet her vocal demands for speaking and singing.   Target Date 03/23/21   Voice Goal 2   Goal Identifier Speaking voice quality   Goal Description In a 20-minute speech task, patient will demonstrate roughness, breathiness, strain, and pitch/phonation breaks that do not exceed a level of 2 out of 10, 90% of the time by SLP judgment, so that patient is able to meet her speaking voice quality demands.   Target Date 03/23/21   Voice Goal 3   Goal Identifier Singing/Pitch range   Goal Description In a 10-minute singing task, patient will demonstrate ability to phonate to G5 without delayed onset and with roughness, breathiness, and strain that do not exceed a level of 2 out of 10 by SLP judgment, in order to demonstrate reduced vocal fold nodules.   Target Date 03/23/21   Voice Goal 4   Goal Identifier Hygiene/Impact   Goal Description Patient will learn, demonstrate, and implement use of 2-3 vocal hygiene strategies (e.g. hydration, periods of vocal rest, avoiding high vocal fold impact behaviors, reflux management) to promote reduced laryngeal irritation and reduced vocal fold impact.   Target Date 12/23/20   Total Session Time   Voice Minutes ([id number]) 30   Total Evaluation Time 50     Thank you for the referral of this patient.    Allison Alpers, B.A. (music), [place]enne[place], CCC-SLP  Speech-Language Pathologist  Certificate of Vocology  [facility]  [phone]

## 2020-10-15 ENCOUNTER — HOSPITAL ENCOUNTER (OUTPATIENT)
Dept: SPEECH THERAPY | Facility: CLINIC | Age: 20
Setting detail: THERAPIES SERIES
End: 2020-10-15
Attending: PHYSICIAN ASSISTANT
Payer: COMMERCIAL

## 2020-10-15 PROCEDURE — 92507 TX SP LANG VOICE COMM INDIV: CPT | Mod: GN | Performed by: STUDENT IN AN ORGANIZED HEALTH CARE EDUCATION/TRAINING PROGRAM

## 2020-10-20 ENCOUNTER — HOSPITAL ENCOUNTER (OUTPATIENT)
Dept: SPEECH THERAPY | Facility: CLINIC | Age: 20
Setting detail: THERAPIES SERIES
End: 2020-10-20
Attending: PHYSICIAN ASSISTANT
Payer: COMMERCIAL

## 2020-10-20 PROCEDURE — 92507 TX SP LANG VOICE COMM INDIV: CPT | Mod: GN | Performed by: STUDENT IN AN ORGANIZED HEALTH CARE EDUCATION/TRAINING PROGRAM

## 2020-10-29 ENCOUNTER — HOSPITAL ENCOUNTER (OUTPATIENT)
Dept: SPEECH THERAPY | Facility: CLINIC | Age: 20
Setting detail: THERAPIES SERIES
End: 2020-10-29
Attending: PHYSICIAN ASSISTANT
Payer: COMMERCIAL

## 2020-10-29 PROCEDURE — 92507 TX SP LANG VOICE COMM INDIV: CPT | Mod: GN | Performed by: STUDENT IN AN ORGANIZED HEALTH CARE EDUCATION/TRAINING PROGRAM

## 2020-10-29 NOTE — DISCHARGE INSTRUCTIONS
Singing exercises 10/29/2020      Warm-up exercise:  o  howie  on 5-1  - Should be light and easy     To bring forward focus into singing voice  o  ng  on 5-1-8-1  o  ng  on 1-5-3-8-5-3-1  - Twice with syncopated rhythm   - Should feel  clunky   - To make sure you re using a pure nasal consonant, plug your nose--sound should be completely cut off  o To blend speaking voice with singing voice for easy singing throughout your range  -  Hi there, how are you today  on 5-1, 5-4-3-2-1

## 2020-11-10 ENCOUNTER — HOSPITAL ENCOUNTER (OUTPATIENT)
Dept: SPEECH THERAPY | Facility: CLINIC | Age: 20
Setting detail: THERAPIES SERIES
End: 2020-11-10
Attending: PHYSICIAN ASSISTANT
Payer: COMMERCIAL

## 2020-11-10 PROCEDURE — 92507 TX SP LANG VOICE COMM INDIV: CPT | Mod: GN | Performed by: STUDENT IN AN ORGANIZED HEALTH CARE EDUCATION/TRAINING PROGRAM

## 2020-11-18 ENCOUNTER — HOSPITAL ENCOUNTER (OUTPATIENT)
Dept: SPEECH THERAPY | Facility: CLINIC | Age: 20
Setting detail: THERAPIES SERIES
End: 2020-11-18
Attending: PHYSICIAN ASSISTANT
Payer: COMMERCIAL

## 2020-11-18 PROCEDURE — 92507 TX SP LANG VOICE COMM INDIV: CPT | Mod: GN | Performed by: STUDENT IN AN ORGANIZED HEALTH CARE EDUCATION/TRAINING PROGRAM

## 2020-11-18 NOTE — DISCHARGE INSTRUCTIONS
Singing exercises 11/18/2020      Warm-up exercise:  o  howie  on 5-1  - Should be light and easy     To bring forward focus into singing voice  o  ng  on 5-1-8-1  o  ng  on 1-5-3-8-5-3-1  - Twice with syncopated rhythm   - Should feel  clunky   - To make sure you re using a pure nasal consonant, plug your nose--sound should be completely cut off  o To blend speaking voice with singing voice for easy singing throughout your range  -  Hi there, how are you today  on 5-1, 5-4-3-2-1  -  Hi there, how are you  on 8-1, 8-5-3-1  o To work on a smooth, connected sound  -  my oh my oh my  on 1-2-3-4-5-4-3-2-1  -  voh-ah  on 1-3-5-6-5-3-1  -  I sigh to sing  on 1-8-5-3-1    Top note should still be forward  o To work on breath-voice coordination without excess glottal force  -  yah (h)a (h)a  on 1-3-5-3-1 (staccato/short productions)

## 2020-12-15 ENCOUNTER — HOSPITAL ENCOUNTER (OUTPATIENT)
Dept: SPEECH THERAPY | Facility: CLINIC | Age: 20
Setting detail: THERAPIES SERIES
End: 2020-12-15
Attending: PHYSICIAN ASSISTANT
Payer: COMMERCIAL

## 2020-12-15 PROCEDURE — 92507 TX SP LANG VOICE COMM INDIV: CPT | Mod: GN | Performed by: STUDENT IN AN ORGANIZED HEALTH CARE EDUCATION/TRAINING PROGRAM

## 2021-08-23 NOTE — PROGRESS NOTES
SUBJECTIVE:                                                   Destini Higgins is a 21 year old female who presents to clinic today for the following health issue(s):  No chief complaint on file.      HPI:  ***    No LMP recorded..     Patient is sexually active, No obstetric history on file..  Using {[facility] CONTRACEPTION:745170} for contraception.    reports that she has never smoked. She has never used smokeless tobacco.  {Tobacco Cessation -- Delete if patient is a non-smoker:836888}  STD testing offered?  {[facility] GC/CHLAMYDIA:745751}    Health maintenance updated:  {yes no:406949}    Today's PHQ-2 Score:   PHQ-2 ( 1999 [employer]) 11/16/2018   Q1: Little interest or pleasure in doing things 0   Q2: Feeling down, depressed or hopeless 0   PHQ-2 Score 0     Today's PHQ-9 Score: No flowsheet data found.  Today's EMELYN-7 Score: No flowsheet data found.    Problem list and histories reviewed & adjusted, as indicated.  Additional history: as documented.    Patient Active Problem List   Diagnosis     Leg pain     Hamstring muscle strain     Acne vulgaris     Other migraine without status migrainosus, not intractable     Past Surgical History:   Procedure Laterality Date     REPAIR LIGAMENT HAND  2003      Social History     Tobacco Use     Smoking status: Never Smoker     Smokeless tobacco: Never Used   Substance Use Topics     Alcohol use: No     Alcohol/week: 0.0 standard drinks      Problem (# of Occurrences) Relation (Name,Age of Onset)    Breast Cancer (1) Maternal Grandmother (64): passed from liver failure    Family History Negative (2) Mother, Father    Leukemia (1) Paternal Grandfather    Liver Disease (1) Maternal Grandmother: passed from    Schizophrenia (1) Paternal Uncle       Negative family history of: Diabetes, Coronary Artery Disease            Current Outpatient Medications   Medication Sig     etonogestrel (IMPLANON/NEXPLANON) 68 MG IMPL 1 each by Subdermal route once     FEMYNOR 0.25-35 MG-MCG per tablet  TAKE 1 TABLET BY MOUTH EVERY DAY (Patient not taking: Reported on 4/24/2019)     omeprazole (PRILOSEC) 20 MG DR capsule TAKE 1 CAPSULE BY MOUTH EVERY DAY     propranolol (INDERAL) 40 MG tablet Take 1 tablet (40 mg) by mouth 3 times daily (Patient not taking: Reported on 4/24/2019)     SPIRONOLACTONE PO Take 12.5 mg by mouth daily     SUMAtriptan (IMITREX) 25 MG tablet Take 1 tablet (25 mg) by mouth at onset of headache for migraine May repeat in 2 hours. Max 8 tablets/24 hours. (Patient not taking: Reported on 12/3/2018)     No current facility-administered medications for this visit.     No Known Allergies    ROS:  12 point review of systems negative other than symptoms noted below or in the HPI.  No urinary frequency or dysuria, bladder or kidney problems      OBJECTIVE:     There were no vitals taken for this visit.  There is no height or weight on file to calculate BMI.    Exam:  {[facility] EXAM CHOICES:648574}     In-Clinic Test Results:  No results found for this or any previous visit (from the past 24 hour(s)).    ASSESSMENT/PLAN:                                                      No diagnosis found.    There are no Patient Instructions on file for this visit.    ***    Jahaira Mills MD  [facility]

## 2021-08-25 ENCOUNTER — OFFICE VISIT (OUTPATIENT)
Dept: OBGYN | Facility: CLINIC | Age: 21
End: 2021-08-25
Payer: COMMERCIAL

## 2021-08-25 VITALS
DIASTOLIC BLOOD PRESSURE: 62 MMHG | WEIGHT: 129 LBS | HEART RATE: 74 BPM | SYSTOLIC BLOOD PRESSURE: 114 MMHG | BODY MASS INDEX: 22.02 KG/M2 | HEIGHT: 64 IN

## 2021-08-25 DIAGNOSIS — Z01.419 ENCOUNTER FOR GYNECOLOGICAL EXAMINATION WITHOUT ABNORMAL FINDING: Primary | ICD-10-CM

## 2021-08-25 DIAGNOSIS — Z11.8 SCREENING FOR CHLAMYDIAL DISEASE: ICD-10-CM

## 2021-08-25 DIAGNOSIS — Z12.4 SCREENING FOR CERVICAL CANCER: ICD-10-CM

## 2021-08-25 PROCEDURE — G0145 SCR C/V CYTO,THINLAYER,RESCR: HCPCS | Performed by: OBSTETRICS & GYNECOLOGY

## 2021-08-25 PROCEDURE — 99385 PREV VISIT NEW AGE 18-39: CPT | Performed by: OBSTETRICS & GYNECOLOGY

## 2021-08-25 PROCEDURE — 87491 CHLMYD TRACH DNA AMP PROBE: CPT | Performed by: OBSTETRICS & GYNECOLOGY

## 2021-08-25 ASSESSMENT — PATIENT HEALTH QUESTIONNAIRE - PHQ9
5. POOR APPETITE OR OVEREATING: NOT AT ALL
SUM OF ALL RESPONSES TO PHQ QUESTIONS 1-9: 0

## 2021-08-25 ASSESSMENT — ANXIETY QUESTIONNAIRES
5. BEING SO RESTLESS THAT IT IS HARD TO SIT STILL: NOT AT ALL
6. BECOMING EASILY ANNOYED OR IRRITABLE: NOT AT ALL
3. WORRYING TOO MUCH ABOUT DIFFERENT THINGS: NOT AT ALL
GAD7 TOTAL SCORE: 0
IF YOU CHECKED OFF ANY PROBLEMS ON THIS QUESTIONNAIRE, HOW DIFFICULT HAVE THESE PROBLEMS MADE IT FOR YOU TO DO YOUR WORK, TAKE CARE OF THINGS AT HOME, OR GET ALONG WITH OTHER PEOPLE: NOT DIFFICULT AT ALL
7. FEELING AFRAID AS IF SOMETHING AWFUL MIGHT HAPPEN: NOT AT ALL
1. FEELING NERVOUS, ANXIOUS, OR ON EDGE: NOT AT ALL
2. NOT BEING ABLE TO STOP OR CONTROL WORRYING: NOT AT ALL

## 2021-08-25 ASSESSMENT — MIFFLIN-ST. JEOR: SCORE: 1325.14

## 2021-08-25 NOTE — PROGRESS NOTES
Destini is a 21 year old No obstetric history on file. female who presents for annual exam.     Besides routine health maintenance, she has no other health concerns today .    HPI:  The patient's PCP is Aron Vaca PA-C.  Patient has no concerns today.  She is due for pap.  She is happy with her contraception  She is moving to [place]      GYNECOLOGIC HISTORY:    Patient's last menstrual period was 2021 (exact date).    Regular menses? yes  Menses every 28-30 days.  Length of menses: 5 days    Her current contraception method is: nexplanon.  She  reports that she has never smoked. She has never used smokeless tobacco.    Patient is sexually active.  STD testing offered?  Accepted  Last PHQ-9 score on record =   PHQ-9 SCORE 2021   PHQ-9 Total Score 0     Last GAD7 score on record =   EMELYN-7 SCORE 2021   Total Score 0     Alcohol Score = 4    HEALTH MAINTENANCE:  Cholesterol: None found  Last Mammo: Not applicable, Result: Not applicable, Next Mammo: Due at age 38   Pap: Never  Colonoscopy:  Never, Result: Not applicable, Next Colonoscopy: Due age 43  Dexa:  NA    Health maintenance updated:  no, due for pap    HISTORY:  OB History    Para Term  AB Living   0 0 0 0 0 0   SAB TAB Ectopic Multiple Live Births   0 0 0 0 0       Patient Active Problem List   Diagnosis     Leg pain     Hamstring muscle strain     Acne vulgaris     Other migraine without status migrainosus, not intractable     Past Surgical History:   Procedure Laterality Date     REPAIR LIGAMENT HAND        Social History     Tobacco Use     Smoking status: Never Smoker     Smokeless tobacco: Never Used   Substance Use Topics     Alcohol use: Yes     Alcohol/week: 0.0 standard drinks      Problem (# of Occurrences) Relation (Name,Age of Onset)    Breast Cancer (1) Maternal Grandmother (64): passed from liver failure    Family History Negative (2) Mother, Father    Leukemia (1) Paternal Grandfather    Liver  "Disease (1) Maternal Grandmother: passed from    No Known Problems (6) Sister, Brother, Brother, Maternal Grandfather, Paternal Grandmother, Other    Schizophrenia (1) Paternal Uncle       Negative family history of: Diabetes, Coronary Artery Disease            Current Outpatient Medications   Medication Sig     etonogestrel (NEXPLANON) 68 MG IMPL Inject 1 each Subcutaneous Inserted 12/2018     SPIRONOLACTONE PO Take 12.5 mg by mouth daily     No current facility-administered medications for this visit.     No Known Allergies    Past medical, surgical, social and family histories were reviewed and updated in EPIC.    ROS:   12 point review of systems negative other than symptoms noted below or in the HPI.  No urinary frequency or dysuria, bladder or kidney problems    EXAM:  /62   Pulse 74   Ht 1.626 m (5' 4\")   Wt 58.5 kg (129 lb)   LMP 08/20/2021 (Exact Date)   BMI 22.14 kg/m     BMI: Body mass index is 22.14 kg/m .    PHYSICAL EXAM:  Constitutional:   Appearance: Well nourished, well developed, alert, in no acute distress  Neck:  Lymph Nodes:  No lymphadenopathy present    Thyroid:  Gland size normal, nontender, no nodules or masses present  on palpation  Chest:  Respiratory Effort:  Breathing unlabored  Cardiovascular:    Heart: Auscultation:  Regular rate, normal rhythm, no murmurs present  Breasts: Palpation of Breasts and Axillae:  No masses present on palpation, no breast tenderness., Axillary Lymph Nodes:  No lymphadenopathy present. and No nodularity, asymmetry or nipple discharge bilaterally.  Gastrointestinal:   Abdominal Examination:  Abdomen nontender to palpation, tone normal without rigidity or guarding, no masses present, umbilicus without lesions   Liver and Spleen:  No hepatomegaly present, liver nontender to palpation    Hernias:  No hernias present  Lymphatic: Lymph Nodes:  No other lymphadenopathy present  Skin:  General Inspection:  No rashes present, no lesions present, no areas of "  discoloration  Neurologic:    Mental Status:  Oriented X3.  Normal strength and tone, sensory exam                grossly normal, mentation intact and speech normal.    Psychiatric:   Mentation appears normal and affect normal/bright.         Pelvic Exam:  External Genitalia:     Normal appearance for age, no discharge present, no tenderness present, no inflammatory lesions present, color normal  Vagina:     Normal vaginal vault without central or paravaginal defects, no discharge present, no inflammatory lesions present, no masses present  Bladder:     Nontender to palpation  Urethra:   Urethral Body:  Urethra palpation normal, urethra structural support normal   Urethral Meatus:  No erythema or lesions present  Cervix:     Appearance healthy, no lesions present, nontender to palpation, no bleeding present. Pap collected  Uterus:     Uterus: firm, normal sized and nontender, midplane in position.   Adnexa:     No adnexal tenderness present, no adnexal masses present  Perineum:     Perineum within normal limits, no evidence of trauma, no rashes or skin lesions present  Anus:     Anus within normal limits, no hemorrhoids present  Inguinal Lymph Nodes:     No lymphadenopathy present  Pubic Hair:     Normal pubic hair distribution for age  Genitalia and Groin:     No rashes present, no lesions present, no areas of discoloration, no masses present      COUNSELING:   Reviewed preventive health counseling, as reflected in patient instructions  Special attention given to:        Regular exercise       Healthy diet/nutrition       Contraception       Safe sex practices/STD prevention    BMI: Body mass index is 22.14 kg/m .      ASSESSMENT:  21 year old female with satisfactory annual exam.    ICD-10-CM    1. Encounter for gynecological examination without abnormal finding  Z01.419    2. Screening for cervical cancer  Z12.4 Pap screen only - recommended age 21 - 24 years   3. Screening for chlamydial disease  Z11.8  Chlamydia trachomatis PCR       PLAN:  -Pap today  -Chlamydia today  -Cont Nexplanon for contraception  -Health diet and exercised discussed  Follow-up in 1 year for annual.    Jahaira Mills MD

## 2021-08-26 LAB — C TRACH DNA SPEC QL NAA+PROBE: NEGATIVE

## 2021-08-26 ASSESSMENT — ANXIETY QUESTIONNAIRES: GAD7 TOTAL SCORE: 0

## 2021-08-30 LAB
BKR LAB AP GYN ADEQUACY: NORMAL
BKR LAB AP GYN INTERPRETATION: NORMAL
BKR LAB AP HPV REFLEX: NO
BKR LAB AP LMP: NORMAL
BKR LAB AP PREVIOUS ABNORMAL: NORMAL
PATH REPORT.COMMENTS IMP SPEC: NORMAL
PATH REPORT.RELEVANT HX SPEC: NORMAL

## 2021-11-23 NOTE — PROGRESS NOTES
"  NEXPLANON REMOVAL/REINSERTION:    Is a pregnancy test required: Yes.  Was it positive or negative?  Negative  Was a consent obtained?  Yes    Subjective: Destini Higgins is a 21 year old  presents for Nexplanon.    Patient has been given the opportunity to ask questions about all forms of birth control, including all options appropriate for Destini Higgins. Discussed that no method of birth control, except abstinence is 100% effective against pregnancy or sexually transmitted infection.     Destini Higgins understands planning removal and reinsertion today    The entire removal and insertion procedure was reviewed with the patient, including care after placement.      /66   Pulse 74   Ht 1.626 m (5' 4\")   Wt 58.8 kg (129 lb 9.6 oz)   LMP 12/15/2021 (Approximate)   BMI 22.25 kg/m      PROCEDURE NOTE: -- Nexplanon Removal/Insertion    Technique: On the left arm  Skin prep Betadine  Anesthesia 1% lidocaine, with epi  Procedure: Patient was placed supine with left arm exposed.  Implant located by palpitation. Marking pen used to penny location of distal end of daron. Arm was prepped with Betadine. Incision point was anesthetized with 2.5 mL 1% lidocaine.     Small incision (<5mm) was made at distal end of palpable implant, curved hemostat or mosquito forceps was used to isolate the implant and bring it to the incision, the fibrous capsule containing the implant  was incised and the implant was removed intact.    After stretching the skin with thumb and index finger around the insertion site, skin punctured with the tip of the needle inserted at 30 degrees and then lowered to horizontal position. While lifting the skin with the tip of the needle, inserted the needle to its full length. Applicator was then stabilized and the slider was unlocked by pushing it slightly down. Slider moved back completely until it stopped. Applicator was then removed.    Correct placement of the implant was " confirmed by palpation in the patient's arm and visualizing the purple top of the obturator.   Bandage and pressure dressing applied to insertion site.    Lot # [id number]  Exp: 11/18/23  NDC: 3230-7242-18    EBL: minimal    Complications: none    ASSESSMENT:     ICD-10-CM    1. Surveillance of previously prescribed implantable subdermal contraceptive  Z30.46 HCG Qual, Urine ([id number])     HCG Qual, Urine ([id number])        PLAN:    Given 's handouts, including when to have Nexplanon removed, list of danger s/sx, side effects and follow up recommended. Encouraged condom use for prevention of STD. Back up contraception advised for 7 days. Advised to call for any fever, for prolonged or severe pain or bleeding, abnormal vaginal dischage. She was advised to use pain medications (ibuprofen) as needed for mild to moderate pain.     Jahaira Mills MD

## 2021-12-22 ENCOUNTER — OFFICE VISIT (OUTPATIENT)
Dept: OBGYN | Facility: CLINIC | Age: 21
End: 2021-12-22
Payer: COMMERCIAL

## 2021-12-22 VITALS
HEIGHT: 64 IN | HEART RATE: 74 BPM | DIASTOLIC BLOOD PRESSURE: 66 MMHG | WEIGHT: 129.6 LBS | SYSTOLIC BLOOD PRESSURE: 116 MMHG | BODY MASS INDEX: 22.13 KG/M2

## 2021-12-22 DIAGNOSIS — Z30.46 SURVEILLANCE OF PREVIOUSLY PRESCRIBED IMPLANTABLE SUBDERMAL CONTRACEPTIVE: Primary | ICD-10-CM

## 2021-12-22 LAB — HCG UR QL: NEGATIVE

## 2021-12-22 PROCEDURE — 81025 URINE PREGNANCY TEST: CPT | Performed by: OBSTETRICS & GYNECOLOGY

## 2021-12-22 PROCEDURE — 11983 REMOVE/INSERT DRUG IMPLANT: CPT | Performed by: OBSTETRICS & GYNECOLOGY

## 2021-12-22 RX ORDER — LIDOCAINE HYDROCHLORIDE AND EPINEPHRINE 10; 10 MG/ML; UG/ML
2.5 INJECTION, SOLUTION INFILTRATION; PERINEURAL ONCE
Status: COMPLETED | OUTPATIENT
Start: 2021-12-22 | End: 2021-12-22

## 2021-12-22 RX ADMIN — LIDOCAINE HYDROCHLORIDE AND EPINEPHRINE 2.5 ML: 10; 10 INJECTION, SOLUTION INFILTRATION; PERINEURAL at 11:21

## 2021-12-22 ASSESSMENT — MIFFLIN-ST. JEOR: SCORE: 1327.86
